# Patient Record
Sex: FEMALE | Race: WHITE
[De-identification: names, ages, dates, MRNs, and addresses within clinical notes are randomized per-mention and may not be internally consistent; named-entity substitution may affect disease eponyms.]

---

## 2019-09-10 ENCOUNTER — HOSPITAL ENCOUNTER (OUTPATIENT)
Age: 49
End: 2019-09-10
Payer: COMMERCIAL

## 2019-09-10 DIAGNOSIS — E03.9: Primary | ICD-10-CM

## 2019-09-10 LAB — FREE T3: 2.6 PG/ML (ref 2.18–3.98)

## 2019-09-10 PROCEDURE — 84439 ASSAY OF FREE THYROXINE: CPT

## 2019-09-10 PROCEDURE — 84443 ASSAY THYROID STIM HORMONE: CPT

## 2019-09-10 PROCEDURE — 86800 THYROGLOBULIN ANTIBODY: CPT

## 2019-09-10 PROCEDURE — 86376 MICROSOMAL ANTIBODY EACH: CPT

## 2019-09-10 PROCEDURE — 36415 COLL VENOUS BLD VENIPUNCTURE: CPT

## 2019-09-10 PROCEDURE — 84481 FREE ASSAY (FT-3): CPT

## 2023-05-11 ENCOUNTER — APPOINTMENT (OUTPATIENT)
Dept: PRIMARY CARE | Facility: CLINIC | Age: 53
End: 2023-05-11
Payer: COMMERCIAL

## 2023-06-08 ENCOUNTER — OFFICE VISIT (OUTPATIENT)
Dept: PRIMARY CARE | Facility: CLINIC | Age: 53
End: 2023-06-08
Payer: COMMERCIAL

## 2023-06-08 ENCOUNTER — LAB (OUTPATIENT)
Dept: LAB | Facility: LAB | Age: 53
End: 2023-06-08
Payer: COMMERCIAL

## 2023-06-08 VITALS
DIASTOLIC BLOOD PRESSURE: 80 MMHG | HEART RATE: 62 BPM | HEIGHT: 67 IN | SYSTOLIC BLOOD PRESSURE: 122 MMHG | BODY MASS INDEX: 32.49 KG/M2 | WEIGHT: 207 LBS

## 2023-06-08 DIAGNOSIS — E03.9 ACQUIRED HYPOTHYROIDISM: ICD-10-CM

## 2023-06-08 DIAGNOSIS — E53.8 VITAMIN B12 DEFICIENCY: ICD-10-CM

## 2023-06-08 DIAGNOSIS — E06.3 HASHIMOTO'S DISEASE: Primary | ICD-10-CM

## 2023-06-08 DIAGNOSIS — E55.9 VITAMIN D DEFICIENCY: ICD-10-CM

## 2023-06-08 DIAGNOSIS — M50.20 DISCOGENIC SYNDROME, CERVICAL: ICD-10-CM

## 2023-06-08 DIAGNOSIS — R79.89 ABNORMAL THYROID BLOOD TEST: Primary | ICD-10-CM

## 2023-06-08 PROBLEM — M47.812 ARTHRITIS OF FACET JOINT OF CERVICAL SPINE: Status: ACTIVE | Noted: 2023-06-08

## 2023-06-08 PROBLEM — M50.30 DISCOGENIC SYNDROME, CERVICAL: Status: ACTIVE | Noted: 2023-06-08

## 2023-06-08 PROBLEM — G56.03 CARPAL TUNNEL SYNDROME, BILATERAL: Status: ACTIVE | Noted: 2018-05-03

## 2023-06-08 PROBLEM — M79.7 FIBROMYALGIA: Status: ACTIVE | Noted: 2023-06-08

## 2023-06-08 PROBLEM — N64.59 ABNORMAL BREAST TISSUE: Status: ACTIVE | Noted: 2023-06-08

## 2023-06-08 PROBLEM — G47.33 OBSTRUCTIVE SLEEP APNEA ON CPAP: Status: ACTIVE | Noted: 2023-06-08

## 2023-06-08 LAB
THYROTROPIN (MIU/L) IN SER/PLAS BY DETECTION LIMIT <= 0.05 MIU/L: 0.09 MIU/L (ref 0.44–3.98)
THYROXINE (T4) FREE (NG/DL) IN SER/PLAS: 0.98 NG/DL (ref 0.61–1.12)

## 2023-06-08 PROCEDURE — 1036F TOBACCO NON-USER: CPT | Performed by: INTERNAL MEDICINE

## 2023-06-08 PROCEDURE — 84443 ASSAY THYROID STIM HORMONE: CPT

## 2023-06-08 PROCEDURE — 84439 ASSAY OF FREE THYROXINE: CPT

## 2023-06-08 PROCEDURE — 99214 OFFICE O/P EST MOD 30 MIN: CPT | Performed by: INTERNAL MEDICINE

## 2023-06-08 PROCEDURE — 36415 COLL VENOUS BLD VENIPUNCTURE: CPT

## 2023-06-08 RX ORDER — TOPIRAMATE 25 MG/1
25 TABLET ORAL DAILY
COMMUNITY
Start: 2022-07-19 | End: 2024-01-09 | Stop reason: ALTCHOICE

## 2023-06-08 ASSESSMENT — ENCOUNTER SYMPTOMS
PALPITATIONS: 0
NAUSEA: 0
BACK PAIN: 0
DIARRHEA: 0
FATIGUE: 0
WHEEZING: 0
COUGH: 0
SHORTNESS OF BREATH: 0
ABDOMINAL PAIN: 0
ARTHRALGIAS: 0
BLOOD IN STOOL: 0
VOMITING: 0
CHEST TIGHTNESS: 0

## 2023-06-08 ASSESSMENT — PATIENT HEALTH QUESTIONNAIRE - PHQ9
2. FEELING DOWN, DEPRESSED OR HOPELESS: NOT AT ALL
SUM OF ALL RESPONSES TO PHQ9 QUESTIONS 1 AND 2: 0
1. LITTLE INTEREST OR PLEASURE IN DOING THINGS: NOT AT ALL

## 2023-06-08 NOTE — RESULT ENCOUNTER NOTE
"I called to let her know that her T4 looked fine but that her TSH was suppressed implying that she may have too much thyroid hormone circulating in her system.  She states she is not on any thyroid medication.  She used to see Dr. Aldridge but she would like to see somebody different and she states she does \"not want to travel far \".  I told her that we had plenty of endocrinologist in our system but you do have to drive out of town.  She states she is going to check around especially with her insurance and will let us know who she wants to see.  I told her I would put the referral in."

## 2023-06-08 NOTE — ASSESSMENT & PLAN NOTE
-We are providing refills on her vitamin B12 injections  -She states that what is been most effective for her is to take the B12 injection twice a month as once a month has been an effective  -We will see her back in 6 months and check a B12 level at that time

## 2023-06-08 NOTE — ASSESSMENT & PLAN NOTE
-She will go for a TSH and free T4 and have agreed to call her with results and discussion as to whether she needs supplementation

## 2023-06-08 NOTE — PROGRESS NOTES
Subjective   Patient ID: Michelle Shi is a 52 y.o. female who presents for No chief complaint on file..  HPI  She is here today for reevaluation.  She explains that she continues to have significant issues with her neck and radiculopathy especially down the left side.  She has been going to the pain clinic and seeing Bianca Woodson.  Back in February she had an MRI of her cervical spine and it did show multilevel degenerative disc disease but also what was characterizes moderate to severe foraminal stenosis.  She states that she has been receiving some cortisone injections but unfortunately its not really helping and she is becoming leery about getting repeated cortisone dosing.  She reminds me that she has had cortisone injections in various joints for many years and she is concerned about the cumulative dose.  We did review her MRI report together and we discussed getting an opinion from a surgeon.  She states she is done some research and has a specific surgeon she would like to see in [place].  She states she will contact Bianca about getting that referral and I told her that I am sure that she will be glad to make the referral for a second opinion.  She states that she is also still having issues with her right-sided carpal tunnel syndrome.  We also discussed her headaches which we feel are stemming from her neck pathology.  We are providing a refill on her vitamin B12 as it has been effective in addressing her B12 deficiency.  We also discussed her diagnosis of Hashimoto's thyroid disease.  We are reminded that she was seen by endocrinology, Dr. Aldridge.  She states that her last labs were relatively normal so she was not prescribed a thyroid supplement.  Its been a while so we will have her go get the thyroid blood work and then I will call her with results.  We will go from there  Review of Systems   Constitutional:  Negative for fatigue.   Respiratory:  Negative for cough, chest tightness, shortness of  breath and wheezing.    Cardiovascular:  Negative for chest pain, palpitations and leg swelling.   Gastrointestinal:  Negative for abdominal pain, blood in stool, diarrhea, nausea and vomiting.   Musculoskeletal:  Negative for arthralgias and back pain.     Objective   Physical Exam  Vitals and nursing note reviewed.   Constitutional:       General: She is not in acute distress.     Appearance: Normal appearance.   HENT:      Head: Normocephalic and atraumatic.   Eyes:      Conjunctiva/sclera: Conjunctivae normal.   Cardiovascular:      Rate and Rhythm: Normal rate and regular rhythm.      Heart sounds: Normal heart sounds.   Pulmonary:      Effort: No respiratory distress.      Breath sounds: No wheezing.   Abdominal:      Palpations: Abdomen is soft.      Tenderness: There is no abdominal tenderness. There is no guarding.   Musculoskeletal:         General: No swelling. Normal range of motion.   Skin:     General: Skin is warm and dry.   Neurological:      General: No focal deficit present.      Mental Status: She is alert and oriented to person, place, and time.   Psychiatric:         Behavior: Behavior normal.       Assessment/Plan   Problem List Items Addressed This Visit          Musculoskeletal    Discogenic syndrome, cervical     -MRI was performed February 21, 2023  -She has been going to the pain clinic  -We discussed her current therapy so far and unfortunately is not been all that effective  -She will contact the pain clinic about referral to a neurosurgeon         Relevant Orders    Follow Up In Primary Care       Endocrine/Metabolic    Vitamin D deficiency    Relevant Orders    Vitamin D 25-Hydroxy,Total    Vitamin B12 deficiency     -We are providing refills on her vitamin B12 injections  -She states that what is been most effective for her is to take the B12 injection twice a month as once a month has been an effective  -We will see her back in 6 months and check a B12 level at that time          Relevant Medications    vitamin B complex 987-0-990-2-2 mg/mL injection    Other Relevant Orders    Follow Up In Primary Care    Vitamin B12    Hashimoto's disease - Primary    Relevant Orders    Follow Up In Primary Care    Hypothyroidism     -She will go for a TSH and free T4 and have agreed to call her with results and discussion as to whether she needs supplementation         Relevant Orders    Follow Up In Primary Care    TSH    T4, free          Kiarra Smith,

## 2023-06-08 NOTE — PATIENT INSTRUCTIONS
As we discussed please contact Bianca Woodson about getting a referral to neurosurgery  Before leaving today you will have your thyroid blood work done and when it comes back we will contact you with results  We sent a refill for your vitamin B12  I would like to see you back in 6 months and just prior to that visit we will be checking a vitamin B12 and vitamin D level

## 2023-07-07 ENCOUNTER — TELEPHONE (OUTPATIENT)
Dept: PRIMARY CARE | Facility: CLINIC | Age: 53
End: 2023-07-07
Payer: COMMERCIAL

## 2023-07-07 NOTE — TELEPHONE ENCOUNTER
Pt called stating that she has been light headed the last 4 days, she think it has something to do with her thyroid and would like for you to order labs to evaluate. I did tell her it may be best to just have an appointment with you. Please advise, Thank you.

## 2023-07-07 NOTE — TELEPHONE ENCOUNTER
"Please advise that exactly 1 month ago she had thyroid blood work ALREADY and it came back abnormal.  (Please see lab test results and my extensive message that I had documented after calling her and discussing results and plan for follow-up).  She had lab work done approximately 4 weeks ago and the thyroid blood test was abnormal.  When I contacted her she indicated that she was not taking any thyroid medication.  I told her that it appeared that she was suffering from hyperthyroidism and I wanted her to see endocrinology.  She had been seeing Dr. Shore but she indicated that she did not want to go there anymore.  I told her that I would like for her to see somebody right away. She then indicated that she did not \"want to drive out of town\".  I told her that all of our providers are out of town.  She then told me that she was going to check around and check with her insurance regarding a provider that she is permitted to see.  She has not gotten back with me.  I do not really think that rechecking the thyroid now will be of use as I am sure it is still abnormal since nothing is happened with treatment.  I would be happy to see her in the office next week to discuss her symptoms and if her symptoms get severe she needs to go to the emergency room.  I hope this is helpful and lets get a referral going to endocrinology as I had originally recommended.  Thank you  "

## 2023-10-09 PROBLEM — L65.9 HAIR LOSS: Status: ACTIVE | Noted: 2023-10-09

## 2023-10-09 PROBLEM — M75.101 TEAR OF RIGHT SUPRASPINATUS TENDON: Status: ACTIVE | Noted: 2023-10-09

## 2023-10-09 PROBLEM — M54.9 BACK PAIN, CHRONIC: Status: ACTIVE | Noted: 2023-10-09

## 2023-10-09 PROBLEM — M54.12 CERVICAL NEURITIS: Status: ACTIVE | Noted: 2023-10-09

## 2023-10-09 PROBLEM — G47.00 INSOMNIA DISORDER: Status: ACTIVE | Noted: 2023-10-09

## 2023-10-09 PROBLEM — G56.00 CARPAL TUNNEL SYNDROME: Status: ACTIVE | Noted: 2023-10-09

## 2023-10-09 PROBLEM — G89.29 BACK PAIN, CHRONIC: Status: ACTIVE | Noted: 2023-10-09

## 2023-10-09 PROBLEM — E66.812 CLASS 2 SEVERE OBESITY DUE TO EXCESS CALORIES WITH SERIOUS COMORBIDITY AND BODY MASS INDEX (BMI) OF 37.0 TO 37.9 IN ADULT: Status: ACTIVE | Noted: 2023-10-09

## 2023-10-09 PROBLEM — M54.2 NECK PAIN, CHRONIC: Status: ACTIVE | Noted: 2023-10-09

## 2023-10-09 PROBLEM — I63.9 STROKE (MULTI): Status: ACTIVE | Noted: 2023-10-09

## 2023-10-09 PROBLEM — E66.01 CLASS 2 SEVERE OBESITY DUE TO EXCESS CALORIES WITH SERIOUS COMORBIDITY AND BODY MASS INDEX (BMI) OF 37.0 TO 37.9 IN ADULT (MULTI): Status: ACTIVE | Noted: 2023-10-09

## 2023-10-09 PROBLEM — R69 TAKING MEDICATION FOR CHRONIC DISEASE: Status: ACTIVE | Noted: 2023-10-09

## 2023-10-09 PROBLEM — M75.40 SHOULDER IMPINGEMENT SYNDROME: Status: ACTIVE | Noted: 2023-10-09

## 2023-10-09 PROBLEM — G89.29 NECK PAIN, CHRONIC: Status: ACTIVE | Noted: 2023-10-09

## 2023-10-09 PROBLEM — R53.83 FATIGUE: Status: ACTIVE | Noted: 2023-10-09

## 2023-10-09 PROBLEM — M77.01 MEDIAL EPICONDYLITIS OF RIGHT ELBOW: Status: ACTIVE | Noted: 2023-10-09

## 2023-10-09 PROBLEM — G56.21 CUBITAL TUNNEL SYNDROME ON RIGHT: Status: ACTIVE | Noted: 2023-10-09

## 2023-10-09 RX ORDER — LIDOCAINE 560 MG/1
PATCH PERCUTANEOUS; TOPICAL; TRANSDERMAL
COMMUNITY

## 2023-10-09 RX ORDER — CYANOCOBALAMIN 1000 UG/ML
INJECTION, SOLUTION INTRAMUSCULAR; SUBCUTANEOUS
COMMUNITY
Start: 2022-11-12

## 2023-10-09 RX ORDER — PHENYLPROPANOLAMINE/CLEMASTINE 75-1.34MG
TABLET, EXTENDED RELEASE ORAL
COMMUNITY

## 2023-10-09 RX ORDER — TOPIRAMATE 25 MG/1
9-10 TABLET ORAL DAILY
COMMUNITY
End: 2023-10-10 | Stop reason: SDUPTHER

## 2023-10-09 RX ORDER — SYRINGE WITH NEEDLE, 1 ML 27GX1/2"
SYRINGE, EMPTY DISPOSABLE MISCELLANEOUS
COMMUNITY

## 2023-10-09 RX ORDER — HYDROCODONE BITARTRATE AND ACETAMINOPHEN 7.5; 325 MG/1; MG/1
1 TABLET ORAL
COMMUNITY
Start: 2022-10-20

## 2023-10-10 ENCOUNTER — OFFICE VISIT (OUTPATIENT)
Dept: PAIN MEDICINE | Facility: CLINIC | Age: 53
End: 2023-10-10
Payer: COMMERCIAL

## 2023-10-10 VITALS
DIASTOLIC BLOOD PRESSURE: 84 MMHG | BODY MASS INDEX: 30.76 KG/M2 | SYSTOLIC BLOOD PRESSURE: 114 MMHG | RESPIRATION RATE: 16 BRPM | HEART RATE: 97 BPM | HEIGHT: 67 IN | WEIGHT: 196 LBS

## 2023-10-10 DIAGNOSIS — M79.7 FIBROMYALGIA: Primary | ICD-10-CM

## 2023-10-10 DIAGNOSIS — M50.20 DISCOGENIC SYNDROME, CERVICAL: ICD-10-CM

## 2023-10-10 DIAGNOSIS — M54.2 NECK PAIN, CHRONIC: ICD-10-CM

## 2023-10-10 DIAGNOSIS — M54.12 CERVICAL NEURITIS: ICD-10-CM

## 2023-10-10 DIAGNOSIS — G89.29 NECK PAIN, CHRONIC: ICD-10-CM

## 2023-10-10 DIAGNOSIS — M47.812 ARTHRITIS OF FACET JOINT OF CERVICAL SPINE: ICD-10-CM

## 2023-10-10 PROCEDURE — 99213 OFFICE O/P EST LOW 20 MIN: CPT | Performed by: PHYSICIAN ASSISTANT

## 2023-10-10 RX ORDER — TOPIRAMATE 25 MG/1
225-250 TABLET ORAL DAILY
Qty: 900 TABLET | Refills: 0 | Status: SHIPPED | OUTPATIENT
Start: 2023-10-10 | End: 2024-01-09 | Stop reason: SDUPTHER

## 2023-10-10 RX ORDER — DEXTROMETHORPHAN HYDROBROMIDE, GUAIFENESIN 5; 100 MG/5ML; MG/5ML
650 LIQUID ORAL EVERY 8 HOURS PRN
COMMUNITY

## 2023-10-10 ASSESSMENT — PAIN SCALES - GENERAL: PAINLEVEL: 5

## 2023-10-10 ASSESSMENT — ENCOUNTER SYMPTOMS
EYES NEGATIVE: 1
NUMBNESS: 1
ENDOCRINE NEGATIVE: 1
RESPIRATORY NEGATIVE: 1
CONSTITUTIONAL NEGATIVE: 1
ALLERGIC/IMMUNOLOGIC NEGATIVE: 1
PSYCHIATRIC NEGATIVE: 1
CARDIOVASCULAR NEGATIVE: 1
GASTROINTESTINAL NEGATIVE: 1
NECK PAIN: 1
HEMATOLOGIC/LYMPHATIC NEGATIVE: 1

## 2023-10-10 ASSESSMENT — PATIENT HEALTH QUESTIONNAIRE - PHQ9
SUM OF ALL RESPONSES TO PHQ9 QUESTIONS 1 AND 2: 0
1. LITTLE INTEREST OR PLEASURE IN DOING THINGS: NOT AT ALL
2. FEELING DOWN, DEPRESSED OR HOPELESS: NOT AT ALL

## 2023-10-10 ASSESSMENT — COLUMBIA-SUICIDE SEVERITY RATING SCALE - C-SSRS
1. IN THE PAST MONTH, HAVE YOU WISHED YOU WERE DEAD OR WISHED YOU COULD GO TO SLEEP AND NOT WAKE UP?: NO
2. HAVE YOU ACTUALLY HAD ANY THOUGHTS OF KILLING YOURSELF?: NO
6. HAVE YOU EVER DONE ANYTHING, STARTED TO DO ANYTHING, OR PREPARED TO DO ANYTHING TO END YOUR LIFE?: NO

## 2023-10-10 NOTE — PROGRESS NOTES
Subjective   Patient ID: Michelle Shi is a 53 y.o. female who presents for [facility] Surgical referral Dr. Espinoza wants to do neck fusion. Today having pain in her lt side neck radiating in to her  occipital area in her head,  lt shoulder and at down her Lt arm and lt hand, rates 5/10 now and 10/10 at worst describes as stabbing pain, she will have pins, needles and numbness at times in lt arm and hand Lt hand is sometime cold to touch wand rt hand is warm.   She reports any activity increases her pain worst is any head and neck movement, lifting, bending, personal care, standing for 1 hour and walking 0.5 miles all cause increased pain. She  does a HEP stretching, Tylenol, repositioning frequently, and Topamax helps with the HA. LAVON score 58/100.     HPI  patient is a 53-year-old female.  She presents today for follow-up after Seeing Dr. Espinoza.  She states that he did discuss with her possible fusion.  She has gone to 2 appointments now.  She is not sure what she wants to do but she states that she has been thinking about her options.  She had a few questions that I tried to answer to the best my ability.  At this time, she continues to have neck pain with left greater than right arm pain as well as numbness and tingling.  She states that the left pain is much more bothersome than the right-sided pain.  She rates it a 5-10/10 depending on her activities.  She has been doing her home exercise program, Tylenol, and using Topamax.  She tolerates the Topamax and it helps her.  It is not perfect but she states that it is better than before.  At this time, she would like a refill and she wants to just continue on her medications while she continues to weigh her options.  Review of Systems   Constitutional: Negative.    HENT: Negative.     Eyes: Negative.    Respiratory: Negative.     Cardiovascular: Negative.    Gastrointestinal: Negative.    Endocrine: Negative.    Genitourinary: Negative.    Musculoskeletal:  Positive for  neck pain.   Allergic/Immunologic: Negative.    Neurological:  Positive for numbness.   Hematological: Negative.    Psychiatric/Behavioral: Negative.         Objective   Physical Exam  Vitals reviewed.   Constitutional:       Appearance: Normal appearance.   HENT:      Head: Normocephalic.      Right Ear: External ear normal.      Left Ear: External ear normal.      Nose: Nose normal.      Mouth/Throat:      Mouth: Mucous membranes are moist.   Eyes:      Pupils: Pupils are equal, round, and reactive to light.   Cardiovascular:      Rate and Rhythm: Normal rate and regular rhythm.      Pulses: Normal pulses.   Musculoskeletal:         General: Normal range of motion.      Cervical back: Normal range of motion.      Comments: 5/5 upper extremity strength other than left deltoid, biceps, triceps, and MP flexion and extension 4+ to 5 -/5   Neurological:      General: No focal deficit present.      Mental Status: She is alert and oriented to person, place, and time. Mental status is at baseline.   Psychiatric:         Mood and Affect: Mood normal.         Behavior: Behavior normal.         Thought Content: Thought content normal.         Judgment: Judgment normal.         Assessment/Plan   Diagnoses and all orders for this visit:  Fibromyalgia  Arthritis of facet joint of cervical spine  -     topiramate (Topamax) 25 mg tablet; Take 9-10 tablets (225-250 mg) by mouth once daily. Take 4-5 in the morning and 4-5 in the evening. No more than 10 a day.  -     Follow Up In Pain Medicine; Future  Cervical neuritis  -     topiramate (Topamax) 25 mg tablet; Take 9-10 tablets (225-250 mg) by mouth once daily. Take 4-5 in the morning and 4-5 in the evening. No more than 10 a day.  -     Follow Up In Pain Medicine; Future  Discogenic syndrome, cervical  -     topiramate (Topamax) 25 mg tablet; Take 9-10 tablets (225-250 mg) by mouth once daily. Take 4-5 in the morning and 4-5 in the evening. No more than 10 a day.  -     Follow Up  In Pain Medicine; Future  Neck pain, chronic       Patient is a 53-year-old female with a past medical history significant for fibromyalgia, cervical spondylosis, cervical degenerative disease, neck pain, and cervical neuritis.  She saw surgeon who did discuss surgery with her.  She states that she is still considering her options.  She is not sure if she wants to pursue the surgery but she is going to think about it.  I answered her questions to the best of my ability.  At this time, she is going to continue on Topamax.  She tolerates this well.  No side effects but she takes this as prescribed.  A 90-day supply will be sent to her pharmacy.  She is going to follow-up in 3 months for reevaluation and medication management.  She will call the clinic sooner if necessary.  OARRS reviewed.

## 2023-12-07 ENCOUNTER — APPOINTMENT (OUTPATIENT)
Dept: PRIMARY CARE | Facility: CLINIC | Age: 53
End: 2023-12-07
Payer: COMMERCIAL

## 2023-12-08 ENCOUNTER — TELEPHONE (OUTPATIENT)
Dept: PRIMARY CARE | Facility: CLINIC | Age: 53
End: 2023-12-08

## 2023-12-08 ENCOUNTER — LAB (OUTPATIENT)
Dept: LAB | Facility: LAB | Age: 53
End: 2023-12-08
Payer: COMMERCIAL

## 2023-12-08 ENCOUNTER — OFFICE VISIT (OUTPATIENT)
Dept: PRIMARY CARE | Facility: CLINIC | Age: 53
End: 2023-12-08
Payer: COMMERCIAL

## 2023-12-08 VITALS
HEART RATE: 76 BPM | WEIGHT: 190 LBS | BODY MASS INDEX: 29.76 KG/M2 | DIASTOLIC BLOOD PRESSURE: 80 MMHG | SYSTOLIC BLOOD PRESSURE: 124 MMHG | OXYGEN SATURATION: 98 %

## 2023-12-08 DIAGNOSIS — Z12.31 ENCOUNTER FOR SCREENING MAMMOGRAM FOR MALIGNANT NEOPLASM OF BREAST: Primary | ICD-10-CM

## 2023-12-08 DIAGNOSIS — E53.8 VITAMIN B12 DEFICIENCY: ICD-10-CM

## 2023-12-08 DIAGNOSIS — G47.33 OBSTRUCTIVE SLEEP APNEA ON CPAP: ICD-10-CM

## 2023-12-08 DIAGNOSIS — E55.9 VITAMIN D DEFICIENCY: ICD-10-CM

## 2023-12-08 DIAGNOSIS — E06.3 HASHIMOTO'S DISEASE: ICD-10-CM

## 2023-12-08 DIAGNOSIS — M50.20 DISCOGENIC SYNDROME, CERVICAL: ICD-10-CM

## 2023-12-08 DIAGNOSIS — E03.9 ACQUIRED HYPOTHYROIDISM: ICD-10-CM

## 2023-12-08 LAB
25(OH)D3 SERPL-MCNC: 37 NG/ML (ref 30–100)
VIT B12 SERPL-MCNC: 260 PG/ML (ref 211–911)

## 2023-12-08 PROCEDURE — 99213 OFFICE O/P EST LOW 20 MIN: CPT | Performed by: INTERNAL MEDICINE

## 2023-12-08 PROCEDURE — 36415 COLL VENOUS BLD VENIPUNCTURE: CPT

## 2023-12-08 PROCEDURE — 1036F TOBACCO NON-USER: CPT | Performed by: INTERNAL MEDICINE

## 2023-12-08 PROCEDURE — 82607 VITAMIN B-12: CPT

## 2023-12-08 PROCEDURE — 82306 VITAMIN D 25 HYDROXY: CPT

## 2023-12-08 RX ORDER — ACETAMINOPHEN 500 MG
5000 TABLET ORAL DAILY
COMMUNITY

## 2023-12-08 ASSESSMENT — ENCOUNTER SYMPTOMS
ABDOMINAL PAIN: 0
COUGH: 0
DIARRHEA: 0
FATIGUE: 1
PALPITATIONS: 0
VOMITING: 0
BACK PAIN: 0
NECK PAIN: 1
NAUSEA: 0
ARTHRALGIAS: 0
BLOOD IN STOOL: 0
SHORTNESS OF BREATH: 0
WHEEZING: 0

## 2023-12-08 ASSESSMENT — PATIENT HEALTH QUESTIONNAIRE - PHQ9
SUM OF ALL RESPONSES TO PHQ9 QUESTIONS 1 AND 2: 0
2. FEELING DOWN, DEPRESSED OR HOPELESS: NOT AT ALL
1. LITTLE INTEREST OR PLEASURE IN DOING THINGS: NOT AT ALL

## 2023-12-08 NOTE — PROGRESS NOTES
Subjective   Patient ID: Michelle Shi is a 53 y.o. female who presents for Follow-up (6 MO FUV. ).  HPI  She is here today for follow-up.  She just had her blood drawn to follow-up on her vitamin D and B12 level as well as her thyroid condition.  The results are not back but I have agreed to contact her and then we can decide if anything needs to change in the way of her current medical regimen.  She also has been going to the pain clinic and following with Bianca Woodson.  She was also referred to surgery and that they are trying to come up with the best solution for her chronic pain and conditions.  She states that she has been thinking about surgery but she is a little bit apprehensive and she is weighing the options as of now.  We talked about her sleep apnea and she states she is not able to wear the CPAP device because of her nasal fracture.  She states as soon as that gets corrected she will get back to therapy.  We also conducted a review of systems and we talked about cancer screening.  She will be due for her mammogram in just a few weeks and she is agreeable to getting that scheduled early next year.  We also talked about colon cancer screening and today she has not had any type of screening.  She does not have any known family history of colon cancer.  We talked about colonoscopy versus Cologuard versus the fecal occult blood test kit.  She chose the latter and she has agreed to get that in soon.  I will summarize everything in a problem based format .  Review of Systems   Constitutional:  Positive for fatigue.   Respiratory:  Negative for cough, shortness of breath and wheezing.    Cardiovascular:  Negative for chest pain, palpitations and leg swelling.   Gastrointestinal:  Negative for abdominal pain, blood in stool, diarrhea, nausea and vomiting.   Musculoskeletal:  Positive for neck pain. Negative for arthralgias and back pain.     Objective   Physical Exam  Vitals and nursing note reviewed.    Constitutional:       General: She is not in acute distress.     Appearance: Normal appearance.   HENT:      Head: Normocephalic and atraumatic.   Eyes:      Conjunctiva/sclera: Conjunctivae normal.   Cardiovascular:      Rate and Rhythm: Normal rate and regular rhythm.      Heart sounds: Normal heart sounds.   Pulmonary:      Effort: No respiratory distress.      Breath sounds: No wheezing.   Abdominal:      Palpations: Abdomen is soft.      Tenderness: There is no abdominal tenderness. There is no guarding.   Musculoskeletal:         General: No swelling. Normal range of motion.   Skin:     General: Skin is warm and dry.   Neurological:      General: No focal deficit present.      Mental Status: She is alert and oriented to person, place, and time.   Psychiatric:         Behavior: Behavior normal.         Assessment/Plan   Problem List Items Addressed This Visit             ICD-10-CM    Vitamin D deficiency E55.9    Vitamin B12 deficiency E53.8    Obstructive sleep apnea on CPAP G47.33     -not able to wear mask due to nose surgery         Hashimoto's disease E06.3    Hypothyroidism E03.9    Discogenic syndrome, cervical M50.20    Encounter for screening mammogram for malignant neoplasm of breast - Primary Z12.31          Kiarra Smith, DO

## 2023-12-08 NOTE — PATIENT INSTRUCTIONS
As we discussed when your laboratory test results come back we will contact you and at that point might make a recommendation on changing medication  The staff will be helping you to schedule your mammogram at some point early next year  As we discussed you are due for colorectal cancer screening which is recommended when you turn 52  Please complete the FOBT kit next week and drop it off at the

## 2023-12-08 NOTE — TELEPHONE ENCOUNTER
Patient states she is due for mammogram. Can you put the order in and I will get it schedule with patient.

## 2023-12-12 NOTE — RESULT ENCOUNTER NOTE
I called her tonight about her vitamin B 12 and although she is getting injections twice a month we talked about also starting a sublingual preparation and she will try the 1000 mcg under the tongue daily.  For her vitamin D she will continue with the 5000 international units daily as this seems to be helping her vitamin D level in an acceptable range.

## 2024-01-09 ENCOUNTER — OFFICE VISIT (OUTPATIENT)
Dept: PAIN MEDICINE | Facility: CLINIC | Age: 54
End: 2024-01-09
Payer: COMMERCIAL

## 2024-01-09 VITALS
BODY MASS INDEX: 30.23 KG/M2 | SYSTOLIC BLOOD PRESSURE: 132 MMHG | WEIGHT: 193 LBS | HEART RATE: 91 BPM | DIASTOLIC BLOOD PRESSURE: 87 MMHG

## 2024-01-09 DIAGNOSIS — M47.812 ARTHRITIS OF FACET JOINT OF CERVICAL SPINE: ICD-10-CM

## 2024-01-09 DIAGNOSIS — M50.20 DISCOGENIC SYNDROME, CERVICAL: ICD-10-CM

## 2024-01-09 DIAGNOSIS — G89.29 NECK PAIN, CHRONIC: ICD-10-CM

## 2024-01-09 DIAGNOSIS — M54.2 NECK PAIN, CHRONIC: ICD-10-CM

## 2024-01-09 DIAGNOSIS — M79.7 FIBROMYALGIA: ICD-10-CM

## 2024-01-09 DIAGNOSIS — M54.12 CERVICAL NEURITIS: Primary | ICD-10-CM

## 2024-01-09 PROCEDURE — 99213 OFFICE O/P EST LOW 20 MIN: CPT | Performed by: PHYSICIAN ASSISTANT

## 2024-01-09 RX ORDER — TOPIRAMATE 25 MG/1
225-250 TABLET ORAL DAILY
Qty: 900 TABLET | Refills: 0 | Status: SHIPPED | OUTPATIENT
Start: 2024-01-09 | End: 2024-04-09 | Stop reason: SDUPTHER

## 2024-01-09 RX ORDER — LANOLIN ALCOHOL/MO/W.PET/CERES
1000 CREAM (GRAM) TOPICAL DAILY
COMMUNITY

## 2024-01-09 ASSESSMENT — PATIENT HEALTH QUESTIONNAIRE - PHQ9: 2. FEELING DOWN, DEPRESSED OR HOPELESS: NOT AT ALL

## 2024-01-09 ASSESSMENT — ENCOUNTER SYMPTOMS
NUMBNESS: 1
EYES NEGATIVE: 1
HEMATOLOGIC/LYMPHATIC NEGATIVE: 1
CARDIOVASCULAR NEGATIVE: 1
GASTROINTESTINAL NEGATIVE: 1
PSYCHIATRIC NEGATIVE: 1
ALLERGIC/IMMUNOLOGIC NEGATIVE: 1
ENDOCRINE NEGATIVE: 1
RESPIRATORY NEGATIVE: 1
NECK PAIN: 1
ARTHRALGIAS: 1
CONSTITUTIONAL NEGATIVE: 1

## 2024-01-09 ASSESSMENT — PAIN SCALES - GENERAL: PAINLEVEL: 4

## 2024-01-09 ASSESSMENT — COLUMBIA-SUICIDE SEVERITY RATING SCALE - C-SSRS
2. HAVE YOU ACTUALLY HAD ANY THOUGHTS OF KILLING YOURSELF?: NO
1. IN THE PAST MONTH, HAVE YOU WISHED YOU WERE DEAD OR WISHED YOU COULD GO TO SLEEP AND NOT WAKE UP?: NO
6. HAVE YOU EVER DONE ANYTHING, STARTED TO DO ANYTHING, OR PREPARED TO DO ANYTHING TO END YOUR LIFE?: NO

## 2024-01-09 NOTE — PROGRESS NOTES
Subjective   Patient ID: Michelle Shi is a 53 y.o. female who presents for Med Management (FOLLOW UP ON TOPIRAMATE, SHE STATES IT IS HELPING WITH THE BURNING FEELING IN HER NECK AND CONTROLLING HER HEADACHES, SHE GETS DISCOMFORT WITH ROTATING HER HEAD OR LOOKING UP WITH TILTED HEAD, SHE HAS GONE DOWN IN THE NUMBER OF HEADACHES SINCE TAKING TOPIRAMATE SHE HAS AN AVERAGE OF 3 PER WEEK). SHE HAS NORCO PRN, SHE ALTERNATES ICE/HEAT, SHE DOES HER HOME STRETCHING EXERCISES FROM PHYSICAL THERAPY, HER APPT WITH DR. THORPE WAS RESCHEDULED TO LATE JANUARY, PAIN SCORE 4/10, LAVON=46% ,BMI 30.23 ED GIVEN, DEP-, SMOKING -  Patient is a 53-year-old female.  She presents today for a medication management follow-up.  At this time, she continues to have neck pain with left greater than right arm pain as well as numbness and tingling.  She states that the left pain is much more bothersome than the right-sided pain.  She rates her discomfort a 4/10.  She has been doing her home exercise program, Tylenol, and using Topamax.  She tolerates the Topamax and it helps her.  It is not perfect but she states that it is better than before.    She has previously seen Dr. Thorpe who talked to her about having a fusion.  She was supposed to have another appointment earlier in the week but this had to be postponed.  She is supposed to see him in less than 2 weeks.  She would like to see what her options are.  She is considering surgery but she still is not 100% sure what she wants to do.        Review of Systems   Constitutional: Negative.    HENT: Negative.     Eyes: Negative.    Respiratory: Negative.     Cardiovascular: Negative.    Gastrointestinal: Negative.    Endocrine: Negative.    Genitourinary: Negative.    Musculoskeletal:  Positive for arthralgias and neck pain.   Skin: Negative.    Allergic/Immunologic: Negative.    Neurological:  Positive for numbness.   Hematological: Negative.    Psychiatric/Behavioral: Negative.         Objective    Physical Exam  Vitals and nursing note reviewed.   Constitutional:       Appearance: Normal appearance.   HENT:      Head: Normocephalic and atraumatic.      Right Ear: External ear normal.      Left Ear: External ear normal.      Nose: Nose normal.      Mouth/Throat:      Mouth: Mucous membranes are moist.      Pharynx: Oropharynx is clear.   Cardiovascular:      Rate and Rhythm: Normal rate and regular rhythm.      Pulses: Normal pulses.   Pulmonary:      Effort: Pulmonary effort is normal.   Musculoskeletal:         General: Normal range of motion.      Cervical back: Normal range of motion.   Skin:     General: Skin is warm and dry.   Neurological:      General: No focal deficit present.      Mental Status: She is alert and oriented to person, place, and time. Mental status is at baseline.   Psychiatric:         Mood and Affect: Mood normal.         Behavior: Behavior normal.         Thought Content: Thought content normal.         Judgment: Judgment normal.         Assessment/Plan   Diagnoses and all orders for this visit:  Cervical neuritis  -     topiramate (Topamax) 25 mg tablet; Take 9-10 tablets (225-250 mg) by mouth once daily. Take 4-5 in the morning and 4-5 in the evening. No more than 10 a day.  Fibromyalgia  Neck pain, chronic  Arthritis of facet joint of cervical spine  -     topiramate (Topamax) 25 mg tablet; Take 9-10 tablets (225-250 mg) by mouth once daily. Take 4-5 in the morning and 4-5 in the evening. No more than 10 a day.  Discogenic syndrome, cervical  -     topiramate (Topamax) 25 mg tablet; Take 9-10 tablets (225-250 mg) by mouth once daily. Take 4-5 in the morning and 4-5 in the evening. No more than 10 a day.       Patient is a 53-year-old female with a past medical history significant for fibromyalgia, cervical spondylosis, cervical degenerative disease, neck pain, and cervical neuritis.   At this time, she is going to continue on Topamax.  She tolerates this well.  No side effects  but she takes this as prescribed.  A 90-day supply will be sent to her pharmacy.  OARRS reviewed.   In regards to her appointment with her surgeon this had to be postponed.  She is scheduled to see him within the next 2 weeks.  She is going to pursue this appointment and she is going to consider if she wants to have the surgery or not.  She did share with me that she may consider getting a second opinion but at this time, she is going to pursue her appointment with them and see what her options are.  She is in to follow-up in 3 months for medication management follow-up.  Call the clinic sooner if necessary.

## 2024-01-10 ENCOUNTER — HOSPITAL ENCOUNTER (OUTPATIENT)
Dept: RADIOLOGY | Facility: HOSPITAL | Age: 54
Discharge: HOME | End: 2024-01-10
Payer: COMMERCIAL

## 2024-01-10 DIAGNOSIS — Z12.31 ENCOUNTER FOR SCREENING MAMMOGRAM FOR MALIGNANT NEOPLASM OF BREAST: ICD-10-CM

## 2024-01-10 PROCEDURE — 77063 BREAST TOMOSYNTHESIS BI: CPT | Performed by: RADIOLOGY

## 2024-01-10 PROCEDURE — 77067 SCR MAMMO BI INCL CAD: CPT | Performed by: RADIOLOGY

## 2024-01-10 PROCEDURE — 77067 SCR MAMMO BI INCL CAD: CPT

## 2024-02-21 ENCOUNTER — OFFICE VISIT (OUTPATIENT)
Dept: PRIMARY CARE | Facility: CLINIC | Age: 54
End: 2024-02-21
Payer: COMMERCIAL

## 2024-02-21 VITALS
OXYGEN SATURATION: 99 % | WEIGHT: 192 LBS | SYSTOLIC BLOOD PRESSURE: 131 MMHG | HEART RATE: 84 BPM | BODY MASS INDEX: 30.07 KG/M2 | DIASTOLIC BLOOD PRESSURE: 80 MMHG

## 2024-02-21 DIAGNOSIS — S03.00XA DISLOCATION OF TEMPOROMANDIBULAR JOINT, INITIAL ENCOUNTER: Primary | ICD-10-CM

## 2024-02-21 PROBLEM — M47.812 ARTHRITIS OF FACET JOINT OF CERVICAL SPINE: Status: RESOLVED | Noted: 2023-06-08 | Resolved: 2024-02-21

## 2024-02-21 PROBLEM — Z12.31 ENCOUNTER FOR SCREENING MAMMOGRAM FOR MALIGNANT NEOPLASM OF BREAST: Status: RESOLVED | Noted: 2023-12-08 | Resolved: 2024-02-21

## 2024-02-21 PROBLEM — G56.00 CARPAL TUNNEL SYNDROME: Status: RESOLVED | Noted: 2023-10-09 | Resolved: 2024-02-21

## 2024-02-21 PROBLEM — E66.812 CLASS 2 SEVERE OBESITY DUE TO EXCESS CALORIES WITH SERIOUS COMORBIDITY AND BODY MASS INDEX (BMI) OF 37.0 TO 37.9 IN ADULT: Status: RESOLVED | Noted: 2023-10-09 | Resolved: 2024-02-21

## 2024-02-21 PROBLEM — R69 TAKING MEDICATION FOR CHRONIC DISEASE: Status: RESOLVED | Noted: 2023-10-09 | Resolved: 2024-02-21

## 2024-02-21 PROBLEM — E66.01 CLASS 2 SEVERE OBESITY DUE TO EXCESS CALORIES WITH SERIOUS COMORBIDITY AND BODY MASS INDEX (BMI) OF 37.0 TO 37.9 IN ADULT (MULTI): Status: RESOLVED | Noted: 2023-10-09 | Resolved: 2024-02-21

## 2024-02-21 PROBLEM — G56.03 CARPAL TUNNEL SYNDROME, BILATERAL: Status: RESOLVED | Noted: 2018-05-03 | Resolved: 2024-02-21

## 2024-02-21 PROBLEM — R53.83 FATIGUE: Status: RESOLVED | Noted: 2023-10-09 | Resolved: 2024-02-21

## 2024-02-21 PROCEDURE — 1036F TOBACCO NON-USER: CPT | Performed by: INTERNAL MEDICINE

## 2024-02-21 PROCEDURE — 99213 OFFICE O/P EST LOW 20 MIN: CPT | Performed by: INTERNAL MEDICINE

## 2024-02-21 RX ORDER — NAPROXEN 500 MG/1
500 TABLET ORAL
Qty: 20 TABLET | Refills: 0 | Status: SHIPPED | OUTPATIENT
Start: 2024-02-21 | End: 2024-03-02

## 2024-02-21 ASSESSMENT — ENCOUNTER SYMPTOMS
SHORTNESS OF BREATH: 0
BLOOD IN STOOL: 0
PALPITATIONS: 0
RHINORRHEA: 1
NAUSEA: 0
VOMITING: 0
ABDOMINAL PAIN: 0
COUGH: 0
SINUS PRESSURE: 0
DIARRHEA: 0
WHEEZING: 0
SORE THROAT: 0
SINUS PAIN: 0
FEVER: 0

## 2024-02-21 NOTE — ASSESSMENT & PLAN NOTE
-She presents today with a week and a half duration of discomfort around her left ear.  -Her ear exam looks normal  -She has been experiencing jaw pain and she definitely has clicking and crepitus with opening and closing her jaw.  -I do strongly suspect TMJ and I sent a handout to her via [employer] explaining this condition  -I am going to treat her for 10 days with Naprosyn and she is reminded to take this twice a day with food  -I also strongly recommend she wear a bite guard every night when she sleeps because this can often calm down the situation  -She is agreed to contact me to let me know how she is doing and if necessary she might need to see a specialist

## 2024-02-21 NOTE — PATIENT INSTRUCTIONS
As we discussed I sent a prescription to your pharmacy for the medication called Naprosyn and please take this twice a day with food.  I have given you a 10-day treatment in the hopes that this will calm down your condition  It is also vitally important to wear a bite guard every night when you sleep because a lot of people grinding their teeth when they sleep at night and this can make TMJ so much worse  Please read the handout I sent to you via [employer] explaining TMJ  I would sure appreciate an update from you in about a week or so to let me know how you are doing and certainly call if you feel like your condition is worsening or if you develop new symptoms  Please also turn in your FOBT kit at the front window at your earliest convenience

## 2024-02-21 NOTE — PROGRESS NOTES
Subjective   Patient ID: Michelle Shi is a 53 y.o. female who presents for No chief complaint on file..  HPI  She is here today with a 1-1/2-week history of discomfort involving her left ear.  She really has not had any significant respiratory symptoms although she does complain of having sometimes a runny nose.  She also states that her jaw hurts.  We did conduct a review of systems and on today's examination her EAC is patent and her tympanic membrane looks entirely normal.  There is a good cone of light reflex and no erythema is appreciated.  When I palpate her temporomandibular joint region she has a lot of crepitus with opening closing her jaw.  I do strongly suspect that the discomfort around her ear is stemming from TMJ.  We discussed this condition and I am sending her a handout that explains TMJ via [employer].  We discussed treatment and I have decided to give her a short stint of an anti-inflammatory to see if this will calm it down.  I also talked her about wearing a bite guard every time she sleeps at night because this has been proven to help significantly.  She will let me know how she is doing at the end of this treatment and we discussed possibly seeing an oral surgeon if she does not get relief from this condition.  We did conduct a review of systems.  I do remind her to turn in her fecal occult blood test kit ASAP  Review of Systems   Constitutional:  Negative for fever.   HENT:  Positive for congestion, ear pain and rhinorrhea. Negative for sinus pressure, sinus pain and sore throat.    Respiratory:  Negative for cough, shortness of breath and wheezing.    Cardiovascular:  Negative for chest pain, palpitations and leg swelling.   Gastrointestinal:  Negative for abdominal pain, blood in stool, diarrhea, nausea and vomiting.     Objective   Physical Exam  Vitals and nursing note reviewed.   Constitutional:       General: She is not in acute distress.     Appearance: Normal appearance.   HENT:       Head: Normocephalic and atraumatic.   Eyes:      Conjunctiva/sclera: Conjunctivae normal.   Cardiovascular:      Rate and Rhythm: Normal rate and regular rhythm.      Heart sounds: Normal heart sounds.   Pulmonary:      Effort: No respiratory distress.      Breath sounds: No wheezing.   Abdominal:      Palpations: Abdomen is soft.      Tenderness: There is no abdominal tenderness. There is no guarding.   Musculoskeletal:         General: No swelling. Normal range of motion.   Skin:     General: Skin is warm and dry.   Neurological:      General: No focal deficit present.      Mental Status: She is alert and oriented to person, place, and time.   Psychiatric:         Behavior: Behavior normal.       Assessment/Plan   Problem List Items Addressed This Visit             ICD-10-CM    Dislocation of jaw - Primary S03.00XA     -She presents today with a week and a half duration of discomfort around her left ear.  -Her ear exam looks normal  -She has been experiencing jaw pain and she definitely has clicking and crepitus with opening and closing her jaw.  -I do strongly suspect TMJ and I sent a handout to her via [employer] explaining this condition  -I am going to treat her for 10 days with Naprosyn and she is reminded to take this twice a day with food  -I also strongly recommend she wear a bite guard every night when she sleeps because this can often calm down the situation  -She is agreed to contact me to let me know how she is doing and if necessary she might need to see a specialist         Relevant Medications    naproxen (Naprosyn) 500 mg tablet          Kiarra Smith,

## 2024-04-09 ENCOUNTER — OFFICE VISIT (OUTPATIENT)
Dept: PAIN MEDICINE | Facility: CLINIC | Age: 54
End: 2024-04-09
Payer: COMMERCIAL

## 2024-04-09 VITALS
HEART RATE: 96 BPM | DIASTOLIC BLOOD PRESSURE: 84 MMHG | WEIGHT: 188 LBS | BODY MASS INDEX: 29.44 KG/M2 | SYSTOLIC BLOOD PRESSURE: 138 MMHG

## 2024-04-09 DIAGNOSIS — M54.12 CERVICAL NEURITIS: ICD-10-CM

## 2024-04-09 DIAGNOSIS — M47.812 ARTHRITIS OF FACET JOINT OF CERVICAL SPINE: ICD-10-CM

## 2024-04-09 DIAGNOSIS — M75.42 IMPINGEMENT SYNDROME OF LEFT SHOULDER: Primary | ICD-10-CM

## 2024-04-09 DIAGNOSIS — M50.20 DISCOGENIC SYNDROME, CERVICAL: ICD-10-CM

## 2024-04-09 DIAGNOSIS — M75.101 TEAR OF RIGHT SUPRASPINATUS TENDON: ICD-10-CM

## 2024-04-09 PROCEDURE — 99214 OFFICE O/P EST MOD 30 MIN: CPT | Performed by: PHYSICIAN ASSISTANT

## 2024-04-09 RX ORDER — TOPIRAMATE 25 MG/1
225-250 TABLET ORAL DAILY
Qty: 900 TABLET | Refills: 0 | Status: SHIPPED | OUTPATIENT
Start: 2024-04-09 | End: 2024-05-14 | Stop reason: SDUPTHER

## 2024-04-09 ASSESSMENT — ENCOUNTER SYMPTOMS
PSYCHIATRIC NEGATIVE: 1
ARTHRALGIAS: 1
NECK PAIN: 1
CARDIOVASCULAR NEGATIVE: 1
EYES NEGATIVE: 1
ENDOCRINE NEGATIVE: 1
RESPIRATORY NEGATIVE: 1
ALLERGIC/IMMUNOLOGIC NEGATIVE: 1
CONSTITUTIONAL NEGATIVE: 1
HEMATOLOGIC/LYMPHATIC NEGATIVE: 1
NUMBNESS: 1
HEADACHES: 1
WEAKNESS: 1
NECK STIFFNESS: 1
GASTROINTESTINAL NEGATIVE: 1
MYALGIAS: 1

## 2024-04-09 NOTE — PROGRESS NOTES
Subjective   Patient ID: Michelle Shi is a 53 y.o. female who presents for Neck Pain (ONGOING NECK PAIN, SHE FEELS HER NECK IS GETTING WORSE, THE PAIN WHEN SHE GETS HEADACHES FEELS LIKE  A BRUISE WITH PAIN INTO THE LEFT ARM INTO THE LEFT SHOULDER, ). SHE HAS LEFT SHOULDER PAIN AND HAD SURGERY WAS SUPPOSED TO HAVE ANOTHER SURGERY WITH DR. CHICAS BUT HE LEFT, SHE ISN'T SURE IF THE PAIN IS COMING FROM THE SHOULDER CAUSING THE HEADACHES TO FEEL WORSE OR NOT LIKE SHE'S BEEN HIT IN THE BACK OF THE HEAD AND BRUISED, SHE IS GETTING THE HEADACHES 3-4 TIMES A WEEK, SHE JUST HAD ONCE THAT LASTED 2 DAYS SHE HAS TO LAY IN BED IN THE DARK FACE DOWN SHE CANNOT TOUCH THE BACK OF HER HEAD, THE TOPIRIMATE IS CONTROLLING THE REGULAR HEADACHES AND THE BEE HIVE ARM FEELING, SHE WOULD LIKE REFILL , SHE HAS OLD SCRIPT OF NORCO FROM DR. CHICAS TAKES PRN, PAIN SCORE 10/10, LAVON=50%, ORT=0    Chantell Terrazas CMA 04/09/24 10:32 AM     Patient is a 53-year-old female.  She presents today for a 3-month medication management follow-up.  At this time, she continues to have neck pain with left greater than right arm pain as well as numbness and tingling.  The right side goes down to her elbow.  The left side goes down into her hand.  She states is a 10/10.  She has difficulty doing things throughout the day.  Her left arm is weak.  She has issues with overhead activities and difficulty with certain maneuvers but she states that her left arm is just miserable.  She has been doing her home exercise program that she was taught by the orthopedic surgeon as well as by the physical therapist.  This is for her shoulder and neck, Tylenol gives slight relief, and she is using Topamax.  She tolerates the Topamax and it helps her.  It is not perfect but she states that it is better than before.    She has previously seen Dr. Espinoza who talked to her about having a fusion on her neck.  She has also been previously told she should have another shoulder surgery.   She is just not sure what she should do to try to get the relief she is looking for.  She wants to get some sort of improvement.    She canceled her last appointment with her surgeon because she states that she called and they urged her to have surgery and she just is not sure that she wants to do.        Review of Systems   Constitutional: Negative.    HENT: Negative.     Eyes: Negative.    Respiratory: Negative.     Cardiovascular: Negative.    Gastrointestinal: Negative.    Endocrine: Negative.    Genitourinary: Negative.    Musculoskeletal:  Positive for arthralgias, myalgias, neck pain and neck stiffness.   Skin: Negative.    Allergic/Immunologic: Negative.    Neurological:  Positive for weakness, numbness and headaches.   Hematological: Negative.    Psychiatric/Behavioral: Negative.         Objective   Physical Exam  Vitals and nursing note reviewed.   Constitutional:       Appearance: Normal appearance.   HENT:      Head: Normocephalic and atraumatic.      Right Ear: External ear normal.      Left Ear: External ear normal.      Nose: Nose normal.      Mouth/Throat:      Pharynx: Oropharynx is clear.   Eyes:      Conjunctiva/sclera: Conjunctivae normal.   Cardiovascular:      Rate and Rhythm: Normal rate and regular rhythm.      Pulses: Normal pulses.   Pulmonary:      Effort: Pulmonary effort is normal.   Musculoskeletal:         General: Normal range of motion.      Cervical back: Normal range of motion.      Comments: 5/5 upper extremity strength on the right side  Left side 4/5 with difficulty with overhead activity and movement of the left shoulder  Has her arm in a neutral position during the appointment   Skin:     General: Skin is warm and dry.   Neurological:      General: No focal deficit present.      Mental Status: She is alert and oriented to person, place, and time. Mental status is at baseline.   Psychiatric:         Mood and Affect: Mood normal.         Behavior: Behavior normal.          Thought Content: Thought content normal.         Judgment: Judgment normal.         MR shoulder  Status: Final result     PACS Images     Show images for MR shoulder  Signed by    Signed Time Phone Pager   Tylor Luis MD 7/13/2022 11:57 [phone]     Exam Information    Status Exam Begun Exam Ended   Final 7/12/2022 19:40 7/12/2022 20:20     Study Result    Narrative & Impression   MRN: [id number]  Patient Name: DON MORILLO     STUDY:  MRI of the  left shoulder without IV contrast;  7/12/2022 8:20 pm     INDICATION:  left shoulder pain  M25.512: Left shoulder pain.     COMPARISON:  MRI dated 02/08/2022     ACCESSION NUMBER(S):  [id number]     ORDERING CLINICIAN:  DANIEL CHICAS     TECHNIQUE:  MR imaging of the  left shoulder was obtained  without IV contrast.     FINDINGS:  ROTATOR CUFF TENDONS:  There is redemonstration of thickening of the supraspinatus and  infraspinatus tendons with increased intrasubstance signal, mildly  progressed from the prior examination and most consistent with  moderate supraspinatus and mild infraspinatus tendinosis. The teres  minor tendon is intact. The subscapularis tendon is intact.  There is mild supraspinatus muscle atrophy without edema.     BICEPS TENDON AND ROTATOR INTERVAL:  The intra-articular portion of the long head biceps tendon is mildly  thickened and edematous consistent with a mild tendinosis. The  rotator interval is unremarkable.     JOINTS:  There are moderate acromioclavicular productive degenerative changes  with capsular distension, small joint effusion, and marrow edema.  There obliteration of the underlying subacromial fat plane.     There is moderate glenohumeral articular cartilage loss.     There is no sizable glenohumeral joint effusion. There is a small  volume of fluid and edema in the subacromial subdeltoid bursa.     LABRUM:  There is mild circumferential truncation of the glenoid labrum  without linear tearing     OSSEOUS STRUCTURES:  No focal  marrow replacing lesions are identified. There is no  fracture.     SOFT TISSUES:  The suprascapular nerve is intact at the suprascapular and  spinoglenoid notches.     IMPRESSION:  1. Moderate supraspinatus and mild infraspinatus tendinosis without  tear.  2. Mild supraspinatus muscle atrophy.  3. Mild intra-articular long head biceps tendinosis.  4. Moderate acromioclavicular and glenohumeral osteoarthrosis. There  are findings which may reflect external impingement in the  appropriate clinical scenario.  5. Small volume fluid in the subacromial subdeltoid bursa. Correlate  with concern for bursitis.     MR CERVICAL SPINE WO IV CONTRAST  Status: Final result     PACS Images     Show images for MR CERVICAL SPINE WO IV CONTRAST  Signed by    Signed Time Phone Pager   Cyndee Gordon MD 2/21/2023 16:48  [phone]     Exam Information    Status Exam Begun Exam Ended   Final 2/20/2023 13:54      Study Result    Narrative   Interpreted By:  CYNDEE GORDON MD  MRN: [id number]  Patient Name: DON MORILLO     STUDY:  MRI CERVICAL WO; ;  2/21/2023 4:25 pm     INDICATION:  neck pain ,arm pain and head aches  M54.2: Neck pain, chronic  M47.812: Arthritis of facet joint of cervical spine G89.29:.     COMPARISON:  02/01/2018     ACCESSION NUMBER(S):  [id number]     ORDERING CLINICIAN:  KERI ORNELAS     TECHNIQUE:  Multiplanar, multisequence imaging of the cervical spine was  performed without intravenous contrast administration.     FINDINGS:  Cervicomedullary junction is unremarkable. Spinal cord shows normal  signal intensity and caliber.     Vertebral body heights are well maintained. Bone marrow signal  intensity is normal. Mild anterolisthesis of C7 on T1, trace  anterolisthesis of T1 on T2. Mild degenerative changes in the  atlantoaxial joint.     At C2-C3 no spinal canal or neural foramina stenosis.     At C3-C4 mild disc bulge noted causing minimal indentation of the  anterior thecal sac. Minimal uncovertebral  degeneration noted without  any spinal canal or neural foramina stenosis.     At C4-C5 mild degenerative disc bulge noted, with minimal osteophyte  formation noted causing mild indentation of the anterior thecal sac,  uncovertebral degeneration noted causing mild bilateral neural  foramina narrowing.     At C5-C6 mild disc osteophyte noted causing mild indentation of the  anterior thecal sac, uncovertebral degeneration noted causing severe  left neural foramina, moderate to severe right neural foramina  narrowing.     At C6-C7 mild degenerative disc bulge noted causing mild indentation  of the anterior thecal sac. No spinal canal or neural foramina  stenosis.     At C7-T1 right-sided perineural cysts noted. No spinal canal or  neural foramina stenosis.      Impression   At C5-C6 degenerative changes noted causing severe left neural  foramina, moderate to severe right neural foramina narrowing.     At C4-C5, degenerative changes noted causing mild bilateral neural  foramina narrowing, right greater than the left.     Assessment/Plan   Diagnoses and all orders for this visit:  Impingement syndrome of left shoulder  -     MR shoulder left wo IV contrast; Future  Tear of right supraspinatus tendon  -     MR shoulder left wo IV contrast; Future  Cervical neuritis  -     MR cervical spine wo IV contrast; Future  -     topiramate (Topamax) 25 mg tablet; Take 9-10 tablets (225-250 mg) by mouth once daily. Take 4-5 in the morning and 4-5 in the evening. No more than 10 a day.  Discogenic syndrome, cervical  -     MR cervical spine wo IV contrast; Future  -     topiramate (Topamax) 25 mg tablet; Take 9-10 tablets (225-250 mg) by mouth once daily. Take 4-5 in the morning and 4-5 in the evening. No more than 10 a day.  Arthritis of facet joint of cervical spine  -     topiramate (Topamax) 25 mg tablet; Take 9-10 tablets (225-250 mg) by mouth once daily. Take 4-5 in the morning and 4-5 in the evening. No more than 10 a  day.       Patient is a 53-year-old female with a past medical history significant for cervical neuritis, cervical spondylosis and left shoulder pain and arthritis.  We reviewed her previous MRIs which are both over a-year-old.  Patient states that she is just torn as to what to do.  She is wants to get some relief.  She has neck pain, headaches and left greater than right arm pain that she rates a 10/10.  This affects her ability to get comfort.  This affects her quality of life.  This affects her activities.  At this time she is going to continue on the Topamax and we discussed obtaining updated cervical MRI scan as well as left shoulder MRI scan to discuss possible injection options versus surgical consultation.  We did discuss the possibility of cervical epidural steroid injection but at this time, we will start with obtaining updated advanced imaging and follow-up after.

## 2024-04-18 DIAGNOSIS — M75.42 IMPINGEMENT SYNDROME OF LEFT SHOULDER: Primary | ICD-10-CM

## 2024-04-22 ENCOUNTER — HOSPITAL ENCOUNTER (OUTPATIENT)
Dept: RADIOLOGY | Facility: HOSPITAL | Age: 54
Discharge: HOME | End: 2024-04-22
Payer: COMMERCIAL

## 2024-04-22 DIAGNOSIS — M75.42 IMPINGEMENT SYNDROME OF LEFT SHOULDER: ICD-10-CM

## 2024-04-22 PROCEDURE — 73030 X-RAY EXAM OF SHOULDER: CPT | Mod: LEFT SIDE | Performed by: RADIOLOGY

## 2024-04-22 PROCEDURE — 73030 X-RAY EXAM OF SHOULDER: CPT | Mod: LT

## 2024-05-01 ENCOUNTER — HOSPITAL ENCOUNTER (OUTPATIENT)
Dept: RADIOLOGY | Facility: HOSPITAL | Age: 54
Discharge: HOME | End: 2024-05-01
Payer: COMMERCIAL

## 2024-05-01 DIAGNOSIS — M50.20 DISCOGENIC SYNDROME, CERVICAL: ICD-10-CM

## 2024-05-01 DIAGNOSIS — M75.101 TEAR OF RIGHT SUPRASPINATUS TENDON: ICD-10-CM

## 2024-05-01 DIAGNOSIS — M54.12 CERVICAL NEURITIS: ICD-10-CM

## 2024-05-01 DIAGNOSIS — M75.42 IMPINGEMENT SYNDROME OF LEFT SHOULDER: ICD-10-CM

## 2024-05-01 PROCEDURE — 72141 MRI NECK SPINE W/O DYE: CPT | Performed by: RADIOLOGY

## 2024-05-01 PROCEDURE — 73221 MRI JOINT UPR EXTREM W/O DYE: CPT | Mod: LT

## 2024-05-01 PROCEDURE — 72141 MRI NECK SPINE W/O DYE: CPT

## 2024-05-01 PROCEDURE — 73221 MRI JOINT UPR EXTREM W/O DYE: CPT | Mod: LEFT SIDE | Performed by: RADIOLOGY

## 2024-05-14 ENCOUNTER — OFFICE VISIT (OUTPATIENT)
Dept: PAIN MEDICINE | Facility: CLINIC | Age: 54
End: 2024-05-14
Payer: COMMERCIAL

## 2024-05-14 VITALS
HEART RATE: 91 BPM | RESPIRATION RATE: 16 BRPM | WEIGHT: 189 LBS | SYSTOLIC BLOOD PRESSURE: 130 MMHG | BODY MASS INDEX: 29.6 KG/M2 | DIASTOLIC BLOOD PRESSURE: 84 MMHG

## 2024-05-14 DIAGNOSIS — M75.42 IMPINGEMENT SYNDROME OF LEFT SHOULDER: ICD-10-CM

## 2024-05-14 DIAGNOSIS — M54.12 CERVICAL NEURITIS: ICD-10-CM

## 2024-05-14 DIAGNOSIS — M47.812 ARTHRITIS OF FACET JOINT OF CERVICAL SPINE: ICD-10-CM

## 2024-05-14 DIAGNOSIS — M50.20 DISCOGENIC SYNDROME, CERVICAL: ICD-10-CM

## 2024-05-14 DIAGNOSIS — M75.101 TEAR OF RIGHT SUPRASPINATUS TENDON: ICD-10-CM

## 2024-05-14 DIAGNOSIS — M79.7 FIBROMYALGIA: Primary | ICD-10-CM

## 2024-05-14 PROCEDURE — 99214 OFFICE O/P EST MOD 30 MIN: CPT | Performed by: PHYSICIAN ASSISTANT

## 2024-05-14 RX ORDER — TOPIRAMATE 25 MG/1
225-250 TABLET ORAL DAILY
Qty: 900 TABLET | Refills: 0 | Status: SHIPPED | OUTPATIENT
Start: 2024-05-14 | End: 2024-08-12

## 2024-05-14 ASSESSMENT — ENCOUNTER SYMPTOMS
ARTHRALGIAS: 1
WEAKNESS: 1
ENDOCRINE NEGATIVE: 1
NECK PAIN: 1
CONSTITUTIONAL NEGATIVE: 1
GASTROINTESTINAL NEGATIVE: 1
RESPIRATORY NEGATIVE: 1
HEMATOLOGIC/LYMPHATIC NEGATIVE: 1
EYES NEGATIVE: 1
PSYCHIATRIC NEGATIVE: 1
CARDIOVASCULAR NEGATIVE: 1
ALLERGIC/IMMUNOLOGIC NEGATIVE: 1
MYALGIAS: 1
NUMBNESS: 1

## 2024-05-14 NOTE — PROGRESS NOTES
Subjective   Patient ID: Michelle Shi is a 53 y.o. female who presents for Neck Pain (FUV for MRI review results. Today she is having pain in her LT neck and lt shoulder rates her pain 3-4/10 after Tylenol and 6-10/10 at its worst, describes as aching, sharp, stabbing and pressure. Her pain increases with movement, in one position too long or any activity. She is taking tylenol , Topamax and she will take Norco sparing, Ice, heat, HEP and stretching with  bands she reports these do not help her pain much. ) LAVON score 52%.     Christy Patten RN 05/14/24 8:57 AM     Patient is a 53-year-old female.  She presents today for a follow-up after undergoing MRI scans.  At this time, she continues to have neck pain with left greater than right arm pain as well as numbness and tingling.  The left side is more bothersome than the right side.  She rates it a 3-10/10.  She takes Tylenol and Topamax.  This gives her some improvement.  She tolerates this well.  She has been doing her home exercise program that she was taught by the orthopedic surgeon as well as by the physical therapist. She has previously seen Dr. Espinoza who talked to her about having a fusion on her neck.  She has also been previously told she should have another shoulder surgery.  She refuses injections.  She states that this just not something she wants to do.  She is getting ready to see the spine surgeon.        Review of Systems   Constitutional: Negative.    HENT: Negative.     Eyes: Negative.    Respiratory: Negative.     Cardiovascular: Negative.    Gastrointestinal: Negative.    Endocrine: Negative.    Genitourinary: Negative.    Musculoskeletal:  Positive for arthralgias, myalgias and neck pain.   Skin: Negative.    Allergic/Immunologic: Negative.    Neurological:  Positive for weakness and numbness.   Hematological: Negative.    Psychiatric/Behavioral: Negative.         Objective   Physical Exam  Vitals and nursing note reviewed.   Constitutional:        Appearance: Normal appearance.   HENT:      Head: Normocephalic and atraumatic.      Right Ear: External ear normal.      Left Ear: External ear normal.      Nose: Nose normal.      Mouth/Throat:      Pharynx: Oropharynx is clear.   Eyes:      Conjunctiva/sclera: Conjunctivae normal.   Cardiovascular:      Rate and Rhythm: Normal rate and regular rhythm.      Pulses: Normal pulses.   Pulmonary:      Effort: Pulmonary effort is normal.   Musculoskeletal:         General: Normal range of motion.      Cervical back: Normal range of motion.      Comments: 5/5 upper extremity strength other than left deltoid 4/5  Pain with internal and external rotation of left shoulder  Negative Maria Elena's   Skin:     General: Skin is warm and dry.   Neurological:      General: No focal deficit present.      Mental Status: She is alert and oriented to person, place, and time. Mental status is at baseline.   Psychiatric:         Mood and Affect: Mood normal.         Behavior: Behavior normal.         Thought Content: Thought content normal.         Judgment: Judgment normal.       MR shoulder left wo IV contrast  Status: Final result     PACS Images     Show images for MR shoulder left wo IV contrast  Signed by    Signed Time Phone Pager   Jordin Johnston MD 5/02/2024 09:46 [phone]     Exam Information    Status Exam Begun Exam Ended   Final 5/01/2024 17:36 5/01/2024 18:21     Study Result    Narrative & Impression   Interpreted By:  Jordin Johnston,   STUDY:  MRI of the left shoulder  without intravenous contrast date 5/1/2024.      INDICATION:  Signs/Symptoms:LEFT SHOULDER PAIN      COMPARISON:  None.      ACCESSION NUMBER(S):  [id number]      ORDERING CLINICIAN:  KERI ORNELAS      TECHNIQUE:  Multiplanar multisequence MRI of the  left shoulder was performed  without intra-articular or intravenous contrast.      FINDINGS:  MUSCLES AND TENDONS:      There is mild increased intermediate signal intensity within  the  distal to insertional supraspinatus and infraspinatus tendons. The  infraspinatus tendon is intact.  The teres minor tendon is intact.  The subscapularis tendon is intact.  There are findings compatible  with prior biceps tenotomy with subpectoral tenodesis. As can be seen  at image 24 of the axial 19 of the sagittal planes there is partial  backing out of the anchor. There also appears to be some intermediate  signal intensity adjacent to the anchor. no significant rotator cuff  muscle atrophy is evident.  No mass is evident and the suprascapular  or spinoglenoid notch or the quadrangular space.      OSSEOUS STRUCTURES AND JOINTS:      No displaced glenoid labral tear is evident. There is  mild  degenerative change of the glenohumeral joint. There is  moderate  degenerative change of the acromioclavicular joint.   The acromion is  type II  without significant lateral downsloping.   There is a trace  volume glenohumeral and acromioclavicular joint effusion.      No fracture or dislocation is evident. Cystic changes are seen at the  greater tubercle of the humerus. Bone marrow signal intensity is  otherwise within normal limits.      SOFT TISSUES:      There is no significant volume of fluid in the subacromial subdeltoid  bursa.  There is no significant volume of fluid in the subcoracoid  bursa. Associated soft tissues of the shoulder are grossly  unremarkable.      IMPRESSION:  1. Partial backing out of the anchor from prior biceps tenodesis.  2. Mild supraspinatus infraspinatus tendinosis.  3. Mild degenerative change of the shoulder.      Signed by: Jordin Johnston 5/2/2024 9:46 AM  Dictation workstation:   [id number]     Result History    MR shoulder left wo IV contrast (Order #[id number]) on 5/2/2024 - Order Result History Report    MR cervical spine wo IV contrast  Status: Final result     PACS Images     Show images for MR cervical spine wo IV contrast  Signed by    Signed Time Phone Pager   Gali HOUGH  DO Monae 5/02/2024 07:34 [phone]     Exam Information    Status Exam Begun Exam Ended   Final 5/01/2024 17:36 5/01/2024 18:37     Study Result    Narrative & Impression   Interpreted By:  Gali Licona,   STUDY:  MR CERVICAL SPINE WO IV CONTRAST;  5/1/2024 6:37 pm      INDICATION:  Signs/Symptoms:NECK AND ARM PAIN, NUMBNESS, AND WEAKNESS.      COMPARISON:  02/21/2023      ACCESSION NUMBER(S):  [id number]      ORDERING CLINICIAN:  KERI ORNELAS      TECHNIQUE:  Sagittal T1, T2, STIR, axial T1 and axial T2 weighted images were  acquired through the cervical spine.      FINDINGS:  Alignment: There is again subtle, grade 1 anterolisthesis of C2 on C3  and C7 on T1.      Vertebrae/Intervertebral Discs: The vertebral bodies demonstrate  expected height.  There is an ovoid focus of increased signal on T1  and T2 weighted imaging within C7 posteriorly favored to represent a  hemangioma. Mild degenerative endplate signal changes are noted at  C5-6. There is multilevel disc desiccation and endplate spurring.      Cord: The cervical cord is somewhat degraded by artifact. No definite  intrinsic signal abnormality is identified within it.      The cerebellar tonsils again extend up to and slightly below the  level of the foramen magnum.      C1-C2: The cervicomedullary junction appears unremarkable. There is  no central canal stenosis. C2-C3: There is no posterior disc contour  abnormality. There is no significant central canal or neural  foraminal stenosis. C3-C4: There is again mild disc bulging without  central canal or neuroforaminal stenosis. There is mild uncovertebral  joint hypertrophy, left greater than right. The findings are similar  from the previous exam. C4-C5: There is again disc bulging without  significant central canal stenosis. Facet and uncovertebral joint  hypertrophy contribute to mild neuroforaminal narrowing. The findings  are similar from the previous exam. C5-C6: Posterior  disc/osteophyte  complex does not significantly narrow the spinal canal. Facet and  uncovertebral joint hypertrophy contribute to severe left and  moderate right-sided neuroforaminal narrowing, similar from the  previous exam. C6-C7: There is again a central disc protrusion  without significant central canal stenosis. The neuroforamina are  patent. C7-T1: There is no posterior disc contour abnormality. Small  perineural root sleeve cysts are again noted on the right. There is  no central canal or neuroforaminal stenosis.      The prevertebral and posterior paraspinous soft tissues are within  normal limits.      IMPRESSION:  Redemonstration of multilevel degenerative changes of the cervical  spine, overall very similar from 02/21/2023.      MACRO:  None      Signed by: Gali Licona 5/2/2024 7:34 AM  Dictation workstation:   [id number]     Assessment/Plan   Diagnoses and all orders for this visit:  Fibromyalgia  Cervical neuritis  -     topiramate (Topamax) 25 mg tablet; Take 9-10 tablets (225-250 mg) by mouth once daily. Take 4-5 in the morning and 4-5 in the evening. No more than 10 a day.  Discogenic syndrome, cervical  -     topiramate (Topamax) 25 mg tablet; Take 9-10 tablets (225-250 mg) by mouth once daily. Take 4-5 in the morning and 4-5 in the evening. No more than 10 a day.  Arthritis of facet joint of cervical spine  -     topiramate (Topamax) 25 mg tablet; Take 9-10 tablets (225-250 mg) by mouth once daily. Take 4-5 in the morning and 4-5 in the evening. No more than 10 a day.  Impingement syndrome of left shoulder  Tear of right supraspinatus tendon       Patient is a 53-year-old female with the above-mentioned medical diagnoses.  She is here today and we reviewed the MRIs.  We reviewed the films and the reports together.  Questions and concerns were all discussed.  I recommended to patient a cervical epidural as well as a shoulder injection.  She declines both.  She states that injections are just  not something that she wants to do.  She has always refused them.  She is getting ready to see the spine surgeon.  She would like to see them and discuss her options.  I once again encouraged her to reconsider the injections and she stated she would think about it and let me know.  She will continue on the Topamax.  Refill sent to the pharmacy.  Follow-up in 3 months.  Call the clinic sooner should she desire a different treatment plan.

## 2024-06-07 ENCOUNTER — APPOINTMENT (OUTPATIENT)
Dept: PRIMARY CARE | Facility: CLINIC | Age: 54
End: 2024-06-07
Payer: COMMERCIAL

## 2024-08-13 ENCOUNTER — OFFICE VISIT (OUTPATIENT)
Dept: PAIN MEDICINE | Facility: CLINIC | Age: 54
End: 2024-08-13
Payer: COMMERCIAL

## 2024-08-13 VITALS
DIASTOLIC BLOOD PRESSURE: 86 MMHG | BODY MASS INDEX: 29.44 KG/M2 | WEIGHT: 188 LBS | SYSTOLIC BLOOD PRESSURE: 130 MMHG | HEART RATE: 84 BPM | RESPIRATION RATE: 16 BRPM

## 2024-08-13 DIAGNOSIS — M79.7 FIBROMYALGIA: Primary | ICD-10-CM

## 2024-08-13 DIAGNOSIS — M54.12 CERVICAL NEURITIS: ICD-10-CM

## 2024-08-13 DIAGNOSIS — M54.2 NECK PAIN, CHRONIC: ICD-10-CM

## 2024-08-13 DIAGNOSIS — M75.101 TEAR OF RIGHT SUPRASPINATUS TENDON: ICD-10-CM

## 2024-08-13 DIAGNOSIS — M75.42 IMPINGEMENT SYNDROME OF LEFT SHOULDER: ICD-10-CM

## 2024-08-13 DIAGNOSIS — M47.812 ARTHRITIS OF FACET JOINT OF CERVICAL SPINE: ICD-10-CM

## 2024-08-13 DIAGNOSIS — G89.29 NECK PAIN, CHRONIC: ICD-10-CM

## 2024-08-13 DIAGNOSIS — M50.20 DISCOGENIC SYNDROME, CERVICAL: ICD-10-CM

## 2024-08-13 DIAGNOSIS — M50.30 DEGENERATION OF CERVICAL INTERVERTEBRAL DISC: ICD-10-CM

## 2024-08-13 DIAGNOSIS — M47.812 CERVICAL SPONDYLOSIS: ICD-10-CM

## 2024-08-13 PROCEDURE — 99214 OFFICE O/P EST MOD 30 MIN: CPT | Performed by: PHYSICIAN ASSISTANT

## 2024-08-13 RX ORDER — TOPIRAMATE 25 MG/1
225-250 TABLET ORAL DAILY
Qty: 900 TABLET | Refills: 0 | Status: SHIPPED | OUTPATIENT
Start: 2024-08-13 | End: 2024-11-11

## 2024-08-13 ASSESSMENT — ENCOUNTER SYMPTOMS
WEAKNESS: 1
ARTHRALGIAS: 1
ENDOCRINE NEGATIVE: 1
ALLERGIC/IMMUNOLOGIC NEGATIVE: 1
NECK STIFFNESS: 1
CARDIOVASCULAR NEGATIVE: 1
RESPIRATORY NEGATIVE: 1
NUMBNESS: 1
PSYCHIATRIC NEGATIVE: 1
HEMATOLOGIC/LYMPHATIC NEGATIVE: 1
NECK PAIN: 1
MYALGIAS: 1
CONSTITUTIONAL NEGATIVE: 1
GASTROINTESTINAL NEGATIVE: 1
EYES NEGATIVE: 1

## 2024-08-13 ASSESSMENT — COLUMBIA-SUICIDE SEVERITY RATING SCALE - C-SSRS
1. IN THE PAST MONTH, HAVE YOU WISHED YOU WERE DEAD OR WISHED YOU COULD GO TO SLEEP AND NOT WAKE UP?: NO
6. HAVE YOU EVER DONE ANYTHING, STARTED TO DO ANYTHING, OR PREPARED TO DO ANYTHING TO END YOUR LIFE?: NO
2. HAVE YOU ACTUALLY HAD ANY THOUGHTS OF KILLING YOURSELF?: NO

## 2024-08-13 NOTE — PROGRESS NOTES
Subjective   Patient ID: Michelle Shi is a 54 y.o. female who presents for No chief complaint on file..  HPI    Review of Systems    Objective   Physical Exam    Assessment/Plan   {Assess/PlanSmartLinks:83102}         Kassy Fishman RN 08/13/24 9:13 AM

## 2024-08-13 NOTE — PROGRESS NOTES
Subjective   Patient ID: Michelle Shi is a 54 y.o. female who presents for Pain (FUV for amy  neck pain with radiation down L arm to and including hand today. Pain score is 5/10 today. Pain is described as a sharp, throbbing constant pain. Looking side to side and bending forward will cause or increase the pain. Nothing helps relieve the pain. Rx for Topamax needed. LAVON = 52/100. SOAPP = 2.).  HPI    Review of Systems    Objective   Physical Exam    Assessment/Plan   {Assess/PlanSmartLinks:86835}         Kassy Fishman RN 08/13/24 9:20 AM

## 2024-08-13 NOTE — PROGRESS NOTES
Subjective   Patient ID: Michelle Shi is a 54 y.o. female who presents for Pain (FUV for amy  neck pain with radiation down L arm to and including hand today. Pain score is 5/10 today. Pain is described as a sharp, throbbing constant pain. Looking side to side and bending forward will cause or increase the pain. Nothing helps relieve the pain. Rx for Topamax needed. LAVON = 52/100. SOAPP = 2.).    Kassy Fishman RN 08/13/24 9:25 AM     Patient is a 53-year-old female.  She presents today for a follow-up after continuing on the Topamax.  She has previously undergone updated MRI scans.  At this time, she continues to have neck pain with left greater than right arm pain as well as numbness and tingling.  The left side goes down into her hand.  The right side goes down to her elbow.  She rates her discomfort a 5/10.  She takes Tylenol and Topamax.  This gives her some improvement.  She tolerates this well. She has been doing her home exercise program that she was taught by the orthopedic surgeon as well as by the physical therapist. She has previously seen Dr. Espinoza who talked to her about having a fusion on her neck.  She states that she had to cancel an appointment because of pain lumbar diet and now they will no longer see her.        Review of Systems   Constitutional: Negative.    HENT: Negative.     Eyes: Negative.    Respiratory: Negative.     Cardiovascular: Negative.    Gastrointestinal: Negative.    Endocrine: Negative.    Genitourinary: Negative.    Musculoskeletal:  Positive for arthralgias, myalgias, neck pain and neck stiffness.   Skin: Negative.    Allergic/Immunologic: Negative.    Neurological:  Positive for weakness and numbness.   Hematological: Negative.    Psychiatric/Behavioral: Negative.         Objective   Physical Exam  Vitals and nursing note reviewed.   Constitutional:       General: She is not in acute distress.     Appearance: Normal appearance. She is not ill-appearing.   HENT:      Head:  Normocephalic and atraumatic.      Right Ear: External ear normal.      Left Ear: External ear normal.      Nose: Nose normal.      Mouth/Throat:      Pharynx: Oropharynx is clear.   Eyes:      Conjunctiva/sclera: Conjunctivae normal.   Cardiovascular:      Rate and Rhythm: Normal rate and regular rhythm.      Pulses: Normal pulses.   Pulmonary:      Effort: Pulmonary effort is normal.      Breath sounds: Normal breath sounds.   Musculoskeletal:         General: Normal range of motion.      Cervical back: Normal range of motion.      Comments: 5/5 upper extremity strength other than left deltoid 4/5  Pain with internal and external rotation of left shoulder  Negative Maria Elena's      Skin:     General: Skin is warm and dry.   Neurological:      General: No focal deficit present.      Mental Status: She is alert and oriented to person, place, and time. Mental status is at baseline.   Psychiatric:         Mood and Affect: Mood normal.         Behavior: Behavior normal.         Thought Content: Thought content normal.         Judgment: Judgment normal.         MR cervical spine wo IV contrast  Status: Final result     PACS Images     Show images for MR cervical spine wo IV contrast  Signed by    Signed Time Phone Pager   Gali Licona DO 5/02/2024 07:34 [phone]     Exam Information    Status Exam Begun Exam Ended   Final 5/01/2024 17:36 5/01/2024 18:37     Study Result    Narrative & Impression   Interpreted By:  Gali Licona,   STUDY:  MR CERVICAL SPINE WO IV CONTRAST;  5/1/2024 6:37 pm      INDICATION:  Signs/Symptoms:NECK AND ARM PAIN, NUMBNESS, AND WEAKNESS.      COMPARISON:  02/21/2023      ACCESSION NUMBER(S):  [id number]      ORDERING CLINICIAN:  KERI ORNELAS      TECHNIQUE:  Sagittal T1, T2, STIR, axial T1 and axial T2 weighted images were  acquired through the cervical spine.      FINDINGS:  Alignment: There is again subtle, grade 1 anterolisthesis of C2 on C3  and C7 on T1.       Vertebrae/Intervertebral Discs: The vertebral bodies demonstrate  expected height.  There is an ovoid focus of increased signal on T1  and T2 weighted imaging within C7 posteriorly favored to represent a  hemangioma. Mild degenerative endplate signal changes are noted at  C5-6. There is multilevel disc desiccation and endplate spurring.      Cord: The cervical cord is somewhat degraded by artifact. No definite  intrinsic signal abnormality is identified within it.      The cerebellar tonsils again extend up to and slightly below the  level of the foramen magnum.      C1-C2: The cervicomedullary junction appears unremarkable. There is  no central canal stenosis. C2-C3: There is no posterior disc contour  abnormality. There is no significant central canal or neural  foraminal stenosis. C3-C4: There is again mild disc bulging without  central canal or neuroforaminal stenosis. There is mild uncovertebral  joint hypertrophy, left greater than right. The findings are similar  from the previous exam. C4-C5: There is again disc bulging without  significant central canal stenosis. Facet and uncovertebral joint  hypertrophy contribute to mild neuroforaminal narrowing. The findings  are similar from the previous exam. C5-C6: Posterior disc/osteophyte  complex does not significantly narrow the spinal canal. Facet and  uncovertebral joint hypertrophy contribute to severe left and  moderate right-sided neuroforaminal narrowing, similar from the  previous exam. C6-C7: There is again a central disc protrusion  without significant central canal stenosis. The neuroforamina are  patent. C7-T1: There is no posterior disc contour abnormality. Small  perineural root sleeve cysts are again noted on the right. There is  no central canal or neuroforaminal stenosis.      The prevertebral and posterior paraspinous soft tissues are within  normal limits.      IMPRESSION:  Redemonstration of multilevel degenerative changes of the  cervical  spine, overall very similar from 02/21/2023.      MACRO:  None      Signed by: Gali Licona 5/2/2024 7:34 AM  Dictation workstation:   [id number]     MR shoulder left wo IV contrast  Status: Final result     PACS Images     Show images for MR shoulder left wo IV contrast  Signed by    Signed Time Phone Pager   Jordin Johnston MD 5/02/2024 09:46 [phone]     Exam Information    Status Exam Begun Exam Ended   Final 5/01/2024 17:36 5/01/2024 18:21     Study Result    Narrative & Impression   Interpreted By:  Jordin Johnston,   STUDY:  MRI of the left shoulder  without intravenous contrast date 5/1/2024.      INDICATION:  Signs/Symptoms:LEFT SHOULDER PAIN      COMPARISON:  None.      ACCESSION NUMBER(S):  [id number]      ORDERING CLINICIAN:  KERI ORNELAS      TECHNIQUE:  Multiplanar multisequence MRI of the  left shoulder was performed  without intra-articular or intravenous contrast.      FINDINGS:  MUSCLES AND TENDONS:      There is mild increased intermediate signal intensity within the  distal to insertional supraspinatus and infraspinatus tendons. The  infraspinatus tendon is intact.  The teres minor tendon is intact.  The subscapularis tendon is intact.  There are findings compatible  with prior biceps tenotomy with subpectoral tenodesis. As can be seen  at image 24 of the axial 19 of the sagittal planes there is partial  backing out of the anchor. There also appears to be some intermediate  signal intensity adjacent to the anchor. no significant rotator cuff  muscle atrophy is evident.  No mass is evident and the suprascapular  or spinoglenoid notch or the quadrangular space.      OSSEOUS STRUCTURES AND JOINTS:      No displaced glenoid labral tear is evident. There is  mild  degenerative change of the glenohumeral joint. There is  moderate  degenerative change of the acromioclavicular joint.   The acromion is  type II  without significant lateral downsloping.   There is a  trace  volume glenohumeral and acromioclavicular joint effusion.      No fracture or dislocation is evident. Cystic changes are seen at the  greater tubercle of the humerus. Bone marrow signal intensity is  otherwise within normal limits.      SOFT TISSUES:      There is no significant volume of fluid in the subacromial subdeltoid  bursa.  There is no significant volume of fluid in the subcoracoid  bursa. Associated soft tissues of the shoulder are grossly  unremarkable.      IMPRESSION:  1. Partial backing out of the anchor from prior biceps tenodesis.  2. Mild supraspinatus infraspinatus tendinosis.  3. Mild degenerative change of the shoulder.      Signed by: Jordin Johnston 5/2/2024 9:46 AM  Dictation workstation:   [id number]     Assessment/Plan   Diagnoses and all orders for this visit:  Fibromyalgia  Neck pain, chronic  Impingement syndrome of left shoulder  Tear of right supraspinatus tendon  Cervical neuritis  -     topiramate (Topamax) 25 mg tablet; Take 9-10 tablets (225-250 mg) by mouth once daily. Take 4-5 in the morning and 4-5 in the evening. No more than 10 a day.  Discogenic syndrome, cervical  -     topiramate (Topamax) 25 mg tablet; Take 9-10 tablets (225-250 mg) by mouth once daily. Take 4-5 in the morning and 4-5 in the evening. No more than 10 a day.  Cervical spondylosis  Degeneration of cervical intervertebral disc  Arthritis of facet joint of cervical spine  -     topiramate (Topamax) 25 mg tablet; Take 9-10 tablets (225-250 mg) by mouth once daily. Take 4-5 in the morning and 4-5 in the evening. No more than 10 a day.       Patient is a 54-year-old female with the above-mentioned medical diagnoses following up after continuing on Topamax.  Reviewed the MRIs.  We discussed different options.  At this time, she still has not decided what she wants to do.  She states that she was supposed to see her spine surgeon and then she had to cancel the appointment because of a death in the family and  they will no longer see her.  She is going to look into other options.  We also discussed having her follow-up with an orthopedic surgeon there is some question of the screw backing out from her previous surgery.  Her surgeon has left the area.  At this time she is continue on Topamax.  Prescription sent to the pharmacy.  She is going to follow-up in 3 months for medication management.  She will let us know who if she decides to do wants to see in who she wants to be referred to.  We discussed injection options.  At this time, she thinks she just wants to see a surgeon and get things taken care of.

## 2024-11-12 ENCOUNTER — APPOINTMENT (OUTPATIENT)
Dept: PAIN MEDICINE | Facility: CLINIC | Age: 54
End: 2024-11-12
Payer: COMMERCIAL

## 2024-11-14 ENCOUNTER — OFFICE VISIT (OUTPATIENT)
Dept: PAIN MEDICINE | Facility: CLINIC | Age: 54
End: 2024-11-14
Payer: COMMERCIAL

## 2024-11-14 VITALS
WEIGHT: 189 LBS | DIASTOLIC BLOOD PRESSURE: 84 MMHG | SYSTOLIC BLOOD PRESSURE: 135 MMHG | HEART RATE: 89 BPM | RESPIRATION RATE: 16 BRPM | BODY MASS INDEX: 29.6 KG/M2

## 2024-11-14 DIAGNOSIS — M50.30 DISCOGENIC SYNDROME, CERVICAL: Primary | ICD-10-CM

## 2024-11-14 DIAGNOSIS — M50.30 DEGENERATION OF CERVICAL INTERVERTEBRAL DISC: ICD-10-CM

## 2024-11-14 DIAGNOSIS — M75.42 IMPINGEMENT SYNDROME OF LEFT SHOULDER: ICD-10-CM

## 2024-11-14 DIAGNOSIS — M79.7 FIBROMYALGIA: ICD-10-CM

## 2024-11-14 DIAGNOSIS — M47.812 ARTHRITIS OF FACET JOINT OF CERVICAL SPINE: ICD-10-CM

## 2024-11-14 DIAGNOSIS — M54.12 CERVICAL NEURITIS: ICD-10-CM

## 2024-11-14 PROCEDURE — 99214 OFFICE O/P EST MOD 30 MIN: CPT | Performed by: PHYSICIAN ASSISTANT

## 2024-11-14 RX ORDER — TOPIRAMATE 25 MG/1
225-250 TABLET ORAL DAILY
Qty: 900 TABLET | Refills: 1 | Status: SHIPPED | OUTPATIENT
Start: 2024-11-14 | End: 2025-05-13

## 2024-11-14 ASSESSMENT — ENCOUNTER SYMPTOMS
ALLERGIC/IMMUNOLOGIC NEGATIVE: 1
EYES NEGATIVE: 1
GASTROINTESTINAL NEGATIVE: 1
NUMBNESS: 1
PSYCHIATRIC NEGATIVE: 1
RESPIRATORY NEGATIVE: 1
MYALGIAS: 1
NECK STIFFNESS: 1
ENDOCRINE NEGATIVE: 1
ARTHRALGIAS: 1
WEAKNESS: 1
CARDIOVASCULAR NEGATIVE: 1
CONSTITUTIONAL NEGATIVE: 1
HEMATOLOGIC/LYMPHATIC NEGATIVE: 1
NECK PAIN: 1

## 2024-11-14 NOTE — PROGRESS NOTES
Subjective   Patient ID: Michelle Shi is a 54 y.o. female who presents for Fibromyalgia (FOLLOW UP FIBROMYALGIA, SHE FEELS THE TOPAMAX IS HELPING A LOT WITH HER PAIN AND HEADACHES, WHEN HE PAIN IS AT ITS WORST SHE WILL USES TYLENOL, LIDOCAINE PATCHES AND LAY DOWN, SHE REPOSITIONS OFTEN, SHE IS DUE FOR A REFILL). SHE NOTES AN INCREASE IN HEADACHES NOT SURE IT IS DUE TO THE WEATHER BUT TWO WEEKS AGO SHE HAD AN EPISODE LASTING 4 DAYS WHERE SHE FELT LIKE SHE WAS HIT IN THE BACK OF THE HEAD BRUISED FEELING AND IT SETTLED AT THE TOP OF THE LEFT SHOULDER, SHE IS STILL LOOKING FOR AN ORTHOPAEDIC SURGEON FOR HER NECK AND LEFT SHOULDER, PAIN SCORE 3/10, LAVON=52%    Chantell Terrazas, [facility] 11/14/24 9:11 AM     Patient is a 54-year-old female.  She presents today for a follow-up after continuing on the Topamax.  She feels that this has helped her fibromyalgia pain and her headaches.  Unfortunate, she still having a lot of neck pain with left greater than and right shoulder pain.  She also has numbness and tingling.    The left side goes down into her hand.  The right side goes down to her elbow.  She rates her discomfort a 3-5/10.  She takes Tylenol, lidocaine patches and Topamax.  This gives her some improvement.  She tolerates this well.   She has been doing her home exercise program that she was taught by the orthopedic surgeon as well as by the physical therapist. She has previously seen Dr. Espinoza who talked to her about having a fusion on her neck.  She had to miss an appointment due to a family issue and now she can no longer get an appointment with them.  She states that she has not found anybody else who is willing to see her.  She also states that she needs to be local.  She cannot drive to [place].  That is just too far for her.        Review of Systems   Constitutional: Negative.    HENT: Negative.     Eyes: Negative.    Respiratory: Negative.     Cardiovascular: Negative.    Gastrointestinal: Negative.    Endocrine:  Negative.    Genitourinary: Negative.    Musculoskeletal:  Positive for arthralgias, myalgias, neck pain and neck stiffness.   Skin: Negative.    Allergic/Immunologic: Negative.    Neurological:  Positive for weakness and numbness.   Hematological: Negative.    Psychiatric/Behavioral: Negative.         Objective   Physical Exam  Vitals and nursing note reviewed.   Constitutional:       General: She is not in acute distress.     Appearance: Normal appearance. She is not ill-appearing.   HENT:      Head: Normocephalic and atraumatic.      Right Ear: External ear normal.      Left Ear: External ear normal.      Nose: Nose normal.      Mouth/Throat:      Pharynx: Oropharynx is clear.   Eyes:      Conjunctiva/sclera: Conjunctivae normal.   Cardiovascular:      Rate and Rhythm: Normal rate and regular rhythm.      Pulses: Normal pulses.   Pulmonary:      Effort: Pulmonary effort is normal.      Breath sounds: Normal breath sounds.   Musculoskeletal:         General: Normal range of motion.      Cervical back: Normal range of motion.      Comments:  5/5 upper extremity strength other than left deltoid 4/5  Pain with internal and external rotation of left shoulder  Negative Maria Elena's       Skin:     General: Skin is warm and dry.   Neurological:      General: No focal deficit present.      Mental Status: She is alert and oriented to person, place, and time. Mental status is at baseline.   Psychiatric:         Mood and Affect: Mood normal.         Behavior: Behavior normal.         Thought Content: Thought content normal.         Judgment: Judgment normal.       MR cervical spine wo IV contrast  Status: Final result     PACS Images     Show images for MR cervical spine wo IV contrast  Signed by    Signed Time Phone Pager   Gali Licona DO 5/02/2024 07:34 [phone]     Exam Information    Status Exam Begun Exam Ended   Final 5/01/2024 17:36 5/01/2024 18:37     Study Result    Narrative & Impression    Interpreted By:  Gali Licona,   STUDY:  MR CERVICAL SPINE WO IV CONTRAST;  5/1/2024 6:37 pm      INDICATION:  Signs/Symptoms:NECK AND ARM PAIN, NUMBNESS, AND WEAKNESS.      COMPARISON:  02/21/2023      ACCESSION NUMBER(S):  [id number]      ORDERING CLINICIAN:  KERI ORNELAS      TECHNIQUE:  Sagittal T1, T2, STIR, axial T1 and axial T2 weighted images were  acquired through the cervical spine.      FINDINGS:  Alignment: There is again subtle, grade 1 anterolisthesis of C2 on C3  and C7 on T1.      Vertebrae/Intervertebral Discs: The vertebral bodies demonstrate  expected height.  There is an ovoid focus of increased signal on T1  and T2 weighted imaging within C7 posteriorly favored to represent a  hemangioma. Mild degenerative endplate signal changes are noted at  C5-6. There is multilevel disc desiccation and endplate spurring.      Cord: The cervical cord is somewhat degraded by artifact. No definite  intrinsic signal abnormality is identified within it.      The cerebellar tonsils again extend up to and slightly below the  level of the foramen magnum.      C1-C2: The cervicomedullary junction appears unremarkable. There is  no central canal stenosis. C2-C3: There is no posterior disc contour  abnormality. There is no significant central canal or neural  foraminal stenosis. C3-C4: There is again mild disc bulging without  central canal or neuroforaminal stenosis. There is mild uncovertebral  joint hypertrophy, left greater than right. The findings are similar  from the previous exam. C4-C5: There is again disc bulging without  significant central canal stenosis. Facet and uncovertebral joint  hypertrophy contribute to mild neuroforaminal narrowing. The findings  are similar from the previous exam. C5-C6: Posterior disc/osteophyte  complex does not significantly narrow the spinal canal. Facet and  uncovertebral joint hypertrophy contribute to severe left and  moderate right-sided neuroforaminal  narrowing, similar from the  previous exam. C6-C7: There is again a central disc protrusion  without significant central canal stenosis. The neuroforamina are  patent. C7-T1: There is no posterior disc contour abnormality. Small  perineural root sleeve cysts are again noted on the right. There is  no central canal or neuroforaminal stenosis.      The prevertebral and posterior paraspinous soft tissues are within  normal limits.      IMPRESSION:  Redemonstration of multilevel degenerative changes of the cervical  spine, overall very similar from 02/21/2023.      MACRO:  None      Signed by: Gali Licona 5/2/2024 7:34 AM  Dictation workstation:   [id number]   MR shoulder left wo IV contrast  Status: Final result     PACS Images     Show images for MR shoulder left wo IV contrast  Signed by    Signed Time Phone Pager   Jordin Johnston MD 5/02/2024 09:46 [phone]     Exam Information    Status Exam Begun Exam Ended   Final 5/01/2024 17:36 5/01/2024 18:21     Study Result    Narrative & Impression   Interpreted By:  Jordin Johnston,   STUDY:  MRI of the left shoulder  without intravenous contrast date 5/1/2024.      INDICATION:  Signs/Symptoms:LEFT SHOULDER PAIN      COMPARISON:  None.      ACCESSION NUMBER(S):  [id number]      ORDERING CLINICIAN:  KERI ORNELAS      TECHNIQUE:  Multiplanar multisequence MRI of the  left shoulder was performed  without intra-articular or intravenous contrast.      FINDINGS:  MUSCLES AND TENDONS:      There is mild increased intermediate signal intensity within the  distal to insertional supraspinatus and infraspinatus tendons. The  infraspinatus tendon is intact.  The teres minor tendon is intact.  The subscapularis tendon is intact.  There are findings compatible  with prior biceps tenotomy with subpectoral tenodesis. As can be seen  at image 24 of the axial 19 of the sagittal planes there is partial  backing out of the anchor. There also appears to be some  intermediate  signal intensity adjacent to the anchor. no significant rotator cuff  muscle atrophy is evident.  No mass is evident and the suprascapular  or spinoglenoid notch or the quadrangular space.      OSSEOUS STRUCTURES AND JOINTS:      No displaced glenoid labral tear is evident. There is  mild  degenerative change of the glenohumeral joint. There is  moderate  degenerative change of the acromioclavicular joint.   The acromion is  type II  without significant lateral downsloping.   There is a trace  volume glenohumeral and acromioclavicular joint effusion.      No fracture or dislocation is evident. Cystic changes are seen at the  greater tubercle of the humerus. Bone marrow signal intensity is  otherwise within normal limits.      SOFT TISSUES:      There is no significant volume of fluid in the subacromial subdeltoid  bursa.  There is no significant volume of fluid in the subcoracoid  bursa. Associated soft tissues of the shoulder are grossly  unremarkable.      IMPRESSION:  1. Partial backing out of the anchor from prior biceps tenodesis.  2. Mild supraspinatus infraspinatus tendinosis.  3. Mild degenerative change of the shoulder.      Signed by: Jordin Johnston 5/2/2024 9:46 AM  Dictation workstation:   [id number]   XR shoulder left 2+ views  Status: Final result     PACS Images     Show images for XR shoulder left 2+ views  Signed by    Signed Time Phone Pager   Inder Flaherty MD 4/22/2024 14:58 [phone]     Exam Information    Status Exam Begun Exam Ended   Final 4/22/2024 10:05 4/22/2024 10:17     Study Result    Narrative & Impression   Interpreted By:  Inder Flaherty,   STUDY:  XR SHOULDER LEFT 2+ VIEWS; ;  4/22/2024 10:17 am      INDICATION:  Signs/Symptoms:left shoulder pain.      COMPARISON:  None.      ACCESSION NUMBER(S):  [id number]      ORDERING CLINICIAN:  KERI ORNELAS      FINDINGS:  LEFT SHOULDER-AP, Y, INTERNAL OBLIQUE, GRASHEY AND AXILLARY VIEWS  The glenohumeral  joint is normal in width.  The articular margins are  intact.  No evidence of osteoarthritis is seen.  There is no soft  tissue calcification.      IMPRESSION:  No acute disease          MACRO:  None      Signed by: Inder Flaherty 4/22/2024 2:58 PM  Dictation workstation:   [id number]     Assessment/Plan   Diagnoses and all orders for this visit:  Discogenic syndrome, cervical  -     Referral to Spine Surgery; Future  -     topiramate (Topamax) 25 mg tablet; Take 9-10 tablets (225-250 mg) by mouth once daily. Take 4-5 in the morning and 4-5 in the evening. No more than 10 a day.  Degeneration of cervical intervertebral disc  -     Referral to Spine Surgery; Future  Cervical neuritis  -     Referral to Spine Surgery; Future  -     topiramate (Topamax) 25 mg tablet; Take 9-10 tablets (225-250 mg) by mouth once daily. Take 4-5 in the morning and 4-5 in the evening. No more than 10 a day.  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 Take 4-5 in the morning and 4-5 in the evening. No more than 10 a day.       Patient is a 54-year-old female with the above-mentioned medical diagnoses following up today Topamax.  She is requesting refills.  This to be sent to her pharmacy.  She also was supposed to see an orthopedic surgeon and a spine surgeon but she has not been seen either.  She states that she cannot find anybody locally to see and she cannot drive all the way to [place].  We once again reviewed her MRIs.  We discussed different options.  She was given the names of some local surgeons.  She is going to see Dr. Smith in [place] for his opinion on her neck and I referred her to [facility] in [place] for her shoulder.  At this time, we discussed a shoulder injection or cervical epidural steroid injection.  She states  that she did them long ago and they did not help.  She does not want to have them again.  She also states that her endocrinologist does not want her having any steroids.  She states that she has lost 50 pounds and they are working towards getting these things better under control and she feels that the steroids would affect this.  She does not want to do any injections.  She will call us if she changes her mind.  Otherwise she is going to continue on the Topamax and follow-up in 3 months.  Call the clinic in the interim should she require anything from our services.  OARRS reviewed.

## 2025-01-29 NOTE — PAT.ANE_ITS
Pre-Assessment    
Diagnosis/Proposed Procedure    
Planned Operative Procedure(s): Anterior Cervical Fusion C5-6    
Anesthesia History    
Anesthesia History - RN Documentation:     
                      Anesthesia History - RN Documentation    
    
    
    
Hx Hospitalization             No                           25 09:03    
     
Any Problems With Anesthesia   No                           25 09:03    
     
Cholinesterase deficiency      No                           25 09:03    
     
You/Your Family Experience     No                           25 09:03    
    
fever (hyperthermia) with           
     
Relationship                        
     
Recent Exposure to Contagious       
    
Disease                             
     
Does patient have nerve        No                           25 09:03    
    
stimulator                          
     
Patient instructed to have          
    
device shut off                     
     
--Does patient have Pacemaker       
    
or ICD?                             
     
When Was Last Pacemaker Check       
    
    
    
*** QUESTION #4 FULL TEXT: You/Your Family Experience fever (hyperthermia) with   
Anesthesia    
    
Last Oral Intake    
Last Oral intake:     
                                Last Oral Intake    
    
    
    
NPO since                           
     
Meds taken in AM with sips of       
    
water?                              
     
Meds patient instructed to          
    
take am of surgery                  
    
    
    
    
PONV    
PONV - RN Documentation:     
                             PONV - RN Documentation    
    
    
    
Female                         Yes                          25 09:03    
     
HX of Motion Sickness          No                           25 09:03    
     
HX of N/V After Surgery        No                           25 09:03    
     
Non-Smoker                     No                           25 09:03    
     
Duration of Surgery greater    Yes                          25 09:03    
    
than 60 minutes                     
     
Number of Risk Factors         2                            25 09:03    
     
PONV Score                     Moderate Risk                25 09:03    
    
    
    
    
Height & Weight    
Height & Weight:     
                           Anesthesia: Height & Weight    
    
    
    
Height                         5 ft 7 in                    24 12:35    
    
    
    
    
Respiratory Assessment    
Respiratory Assessment - RN Documentation:     
                Respiratory Tract Infection Hx - RN Documentation    
    
    
    
Hx Respiratory Tract Infection No                           25 09:03    
    
    
    
    
STOP Sleep Apnea    
STOP Sleep Apnea - RN Documentation:     
                       STOP Sleep Apnea - RN Documentation    
    
    
    
Hx Hypertension                No                           25 09:03    
     
Hx Sleep Apnea                 Yes: NON-COMPLIANT           25 09:03    
     
CPAP                           No                           25 09:03    
     
BIPAP                          No                           25 09:03    
     
Do you snore loudly (louder         
    
than talking or can be heard        
     
Do you often feel tired/            
    
fatigued/ sleepy during             
    
daytime?                            
     
Has anyone observed you stop        
    
breathing during sleep?             
     
STOP Results                   Positive                     25 09:03    
    
    
    
    
*** QUESTION #5 FULL TEXT : Do you snore loudly (louder than talking or can be   
heard through closed doors)?     
    
Tobacco Use History    
Tobacco Use History - RN Documentation:     
                     Tobacco Use History - RN Documentation    
    
    
    
Tobacco Use                         
     
Smoking Status                 Current every day smoker     25 09:03    
     
Hx Tobacco Use                 No                           25 09:03    
     
Years Smoking                       
     
Packs Smoked per Day                
     
Smoking Cessation Date was          
    
within the last 15 years            
     
Hx Smoking Cessation Date           
     
Hx Smoking Cessation                
    
Counseling                          
    
    
    
    
Hematologic Medial History    
Hematologic Hx - RN Documentation:     
                    Hematologic Medical Hx - RN documentation    
    
    
    
Hx of Blood Transfusion        No                           25 09:03    
     
Hx of Transfusion in last 3    No                           25 09:03    
    
Months                              
     
Date of Last Transfusion (if        
    
within last 3 months)               
     
Ever experience any problems   No                           25 09:03    
    
with transfusion(s)?                
     
Specify any problems                
     
Hx of Preganancy in last 3     No                           25 09:03    
    
Months                              
     
Nurse Filling Out Transfusion  VCHRISTIN                    25 09:03    
    
& Pregnancy Questions:              
     
Date:                          25 09:03    
     
Time:                          09:05                        25 09:03    
     
Patient unable to answer at         
    
this time (ie. confused,            
    
unrespo                             
    
    
    
    
Pregnancy/Reproduction History    
Pregnancy/Reproductive History - RN Documentation:     
                   Pregnancy/Reproductive Hx- RN Documentation    
    
    
    
Hx Pregnant Now                No                           25 09:03    
     
Gestational Age (in weeks):         
     
EDC:                                
     
Hx                           
     
Hx Para                             
     
Hx  Section                 
     
SAB                                 
     
Breastfeeding                  No                           25 09:03    
    
    
    
    
    
[facility]    
Medical History (Updated 25 @ 09:03 by Lina Aguirre)    
    
Wears partial dentures    
Wears glasses    
Hashimoto's disease    
Thyroid disease    
Arthritis    
Back pain    
Migraine headache    
Electronic cigarette use    
Sleep apnea    
History of stress test    
Menarche    
Stroke    
    
    
                                Home Medications    
    
    
    
?Medication ?Instructions ?Recorded ?Last Taken ?Type    
     
                          mecobalamin (vitamin B12) 1,000 1,000 mcg sublingual D    
AILY 24 Unknown     
History    
    
                                        mcg disintegrating        
    
                                        tablet,sublingual        
     
                          topiramate 25 mg tablet (Topamax) 25 mg PO BID     
4 Unknown History    
     
                          cholecalciferol (vitamin D3) 25 25 mcg PO DAILY  Unknown History    
    
                                        mcg (1,000 unit) capsule (Vitamin        
    
                                        D3)        
     
                          cyanocobalamin (vitamin B-12) 100 mcg IM .[id number]  Unknown History    
    
                                        1,000 mcg/mL injection solution        
    
                                        (Dodex)        
    
    
    
                                            
    
    
    
Allergy/AdvReac Type Severity Reaction Status Date / Time    
     
nortriptyline AdvReac  Blisters Verified 25 08:49    
     
tramadol AdvReac  hallucinati Verified 25 08:49    
    
   ons      
    
    
    
Family History (Updated 24 @ 12:44 by Mary Swartz)    
Other   Cancer    
   Cardiac disorder    
   Hypertension    
   Kidney disease    
   Thyroid disorder    
    
    
    
Surgical History (Updated 24 @ 12:45 by Mary Swartz)    
    
H/O elbow surgery    
H/O shoulder surgery    
H/O tubal ligation    
History of dilation and curettage    
History of carpal tunnel surgery    
    
    
Social History (Updated 24 @ 12:47 by Mary Swartz)    
household members:  spouse     
Smoking Status:  Current every day smoker tobacco type: e-cigarettes     
alcohol intake:  never     
    
    
    
Recommendation    
Anesthesia Recommendation    
Anesthesia recommendation: OPTIMIZED for anesthesia

## 2025-01-29 NOTE — PAT.ANESEVAL
Pre-Assessment
Diagnosis/Proposed Procedure
Planned Operative Procedure(s): Anterior Cervical Fusion C5-6
Anesthesia History
Anesthesia History - RN Documentation: 
Anesthesia History - RN Documentation

Hx Hospitalization             No                           25 09:03
Any Problems With Anesthesia   No                           25 09:03
Cholinesterase deficiency      No                           25 09:03
You/Your Family Experience     No                           25 09:03
fever (hyperthermia) with       
Relationship                    
Recent Exposure to Contagious   
Disease                         
Does patient have nerve        No                           25 09:03
stimulator                      
Patient instructed to have      
device shut off                 
--Does patient have Pacemaker   
or ICD?                         
When Was Last Pacemaker Check   


*** QUESTION #4 FULL TEXT: You/Your Family Experience fever (hyperthermia) with Anesthesia

Last Oral Intake
Last Oral intake: 
Last Oral Intake

NPO since                       
Meds taken in AM with sips of   
water?                          
Meds patient instructed to      
take am of surgery              



PONV
PONV - RN Documentation: 
PONV - RN Documentation

Female                         Yes                          25 09:03
HX of Motion Sickness          No                           25 09:03
HX of N/V After Surgery        No                           25 09:03
Non-Smoker                     No                           25 09:03
Duration of Surgery greater    Yes                          25 09:03
than 60 minutes                 
Number of Risk Factors         2                            25 09:03
PONV Score                     Moderate Risk                25 09:03



Height & Weight
Height & Weight: 
Anesthesia: Height & Weight

Height                         5 ft 7 in                    24 12:35



Respiratory Assessment
Respiratory Assessment - RN Documentation: 
Respiratory Tract Infection Hx - RN Documentation

Hx Respiratory Tract Infection No                           25 09:03



STOP Sleep Apnea
STOP Sleep Apnea - RN Documentation: 
STOP Sleep Apnea - RN Documentation

Hx Hypertension                No                           25 09:03
Hx Sleep Apnea                 Yes: NON-COMPLIANT           25 09:03
CPAP                           No                           25 09:03
BIPAP                          No                           25 09:03
Do you snore loudly (louder     
than talking or can be heard    
Do you often feel tired/        
fatigued/ sleepy during         
daytime?                        
Has anyone observed you stop    
breathing during sleep?         
STOP Results                   Positive                     25 09:03



*** QUESTION #5 FULL TEXT : Do you snore loudly (louder than talking or can be heard through closed doors)? 

Tobacco Use History
Tobacco Use History - RN Documentation: 
Tobacco Use History - RN Documentation

Tobacco Use                     
Smoking Status                 Current every day smoker     25 09:03
Hx Tobacco Use                 No                           25 09:03
Years Smoking                   
Packs Smoked per Day            
Smoking Cessation Date was      
within the last 15 years        
Hx Smoking Cessation Date       
Hx Smoking Cessation            
Counseling                      



Hematologic Medial History
Hematologic Hx - RN Documentation: 
Hematologic Medical Hx - RN documentation

Hx of Blood Transfusion        No                           25 09:03
Hx of Transfusion in last 3    No                           25 09:03
Months                          
Date of Last Transfusion (if    
within last 3 months)           
Ever experience any problems   No                           25 09:03
with transfusion(s)?            
Specify any problems            
Hx of Preganancy in last 3     No                           25 09:03
Months                          
Nurse Filling Out Transfusion  VCHRISTIN                    25 09:03
& Pregnancy Questions:          
Date:                          25 09:03
Time:                          09:05                        25 09:03
Patient unable to answer at     
this time (ie. confused,        
unrespo                         



Pregnancy/Reproduction History
Pregnancy/Reproductive History - RN Documentation: 
Pregnancy/Reproductive Hx- RN Documentation

Hx Pregnant Now                No                           25 09:03
Gestational Age (in weeks):     
EDC:                            
Hx                       
Hx Para                         
Hx  Section             
SAB                             
Breastfeeding                  No                           25 09:03




[facility]
Medical History (Updated 25 @ 09:03 by Lina Aguirre)

Wears partial dentures
Wears glasses
Hashimoto's disease
Thyroid disease
Arthritis
Back pain
Migraine headache
Electronic cigarette use
Sleep apnea
History of stress test
Menarche
Stroke


Home Medications

?Medication ?Instructions ?Recorded ?Last Taken ?Type
mecobalamin (vitamin B12) 1,000 1,000 mcg sublingual DAILY 24 Unknown History
mcg disintegrating    
tablet,sublingual    
topiramate 25 mg tablet (Topamax) 25 mg PO BID 24 Unknown History
cholecalciferol (vitamin D3) 25 25 mcg PO DAILY 25 Unknown History
mcg (1,000 unit) capsule (Vitamin    
D3)    
cyanocobalamin (vitamin B-12) 100 mcg IM .[id number] 25 Unknown History
1,000 mcg/mL injection solution    
(Dodex)    




Allergy/AdvReac Type Severity Reaction Status Date / Time
nortriptyline AdvReac  Blisters Verified 25 08:49
tramadol AdvReac  hallucinati Verified 25 08:49
   ons  


Family History (Updated 24 @ 12:44 by Mary Swartz)
Other
 Cancer
 Cardiac disorder
 Hypertension
 Kidney disease
 Thyroid disorder



Surgical History (Updated 24 @ 12:45 by Mary Swartz)

H/O elbow surgery
H/O shoulder surgery
H/O tubal ligation
History of dilation and curettage
History of carpal tunnel surgery


Social History (Updated 24 @ 12:47 by Mary Swartz)
household members:  spouse 
Smoking Status:  Current every day smoker tobacco type: e-cigarettes 
alcohol intake:  never 



Recommendation
Anesthesia Recommendation
Anesthesia recommendation: OPTIMIZED for anesthesia

## 2025-01-31 LAB
ANION GAP: 7 (ref 5–15)
BUN SERPL-MCNC: 14 MG/DL (ref 7–18)
BUN/CREAT RATIO: 16 RATIO (ref 10–20)
CALCIUM SERPL-MCNC: 9.4 MG/DL (ref 8.5–10.1)
CARBON DIOXIDE: 23 MMOL/L (ref 21–32)
CHLORIDE: 111 MMOL/L (ref 98–107)
DEPRECATED RDW RBC: 42.6 FL (ref 35.1–43.9)
ERYTHROCYTE [DISTWIDTH] IN BLOOD: 12.4 % (ref 11.6–14.6)
EST GLOM FILT RATE - AFR AMER: 87 ML/MIN (ref 60–?)
GLUCOSE: 92 MG/DL (ref 74–106)
HCT VFR BLD AUTO: 41.8 % (ref 37–47)
HEMOGLOBIN: 13.7 G/DL (ref 12–15)
HGB BLD-MCNC: 13.7 G/DL (ref 12–15)
IMMATURE GRANULOCYTES COUNT: 0.02 X10^3/UL (ref 0–0)
MAGNESIUM: 2.3 MG/DL (ref 1.6–2.6)
MCV RBC: 93.1 FL (ref 81–99)
MEAN CORP HGB CONC: 32.8 G/DL (ref 32–36)
MEAN PLATELET VOL.: 10.3 FL (ref 6.2–12)
NRBC FLAGGED BY ANALYZER: 0 % (ref 0–5)
PLATELET # BLD: 304 K/MM3 (ref 150–450)
PLATELET COUNT: 304 K/MM3 (ref 150–450)
POTASSIUM: 3.8 MMOL/L (ref 3.5–5.1)
RBC # BLD AUTO: 4.49 M/MM3 (ref 4.2–5.4)
RBC DISTRIBUTION WIDTH CV: 12.4 % (ref 11.6–14.6)
RBC DISTRIBUTION WIDTH SD: 42.6 FL (ref 35.1–43.9)
WBC # BLD AUTO: 3.8 K/MM3 (ref 4.4–11)
WHITE BLOOD COUNT: 3.8 K/MM3 (ref 4.4–11)

## 2025-01-31 NOTE — EKG12_ITS
Test Reason : PREOP  
Blood Pressure :   */*   mmHG  
Vent. Rate :  74 BPM     Atrial Rate :  74 BPM  
   P-R Int : 150 ms          QRS Dur :  92 ms  
    QT Int : 358 ms       P-R-T Axes :  59  24  52 degrees  
  QTcB Int : 397 ms  
   
Normal sinus rhythm  
Normal ECG  
   
Confirmed by ALIYAH WONG, PERRY ([id number]),  DYLLAN TAN ([id number]) on 2/3/2025 
6:08:19 AM  
   
Referred By: Jamey Heart           Confirmed By: PERRY LY MD

## 2025-02-03 ENCOUNTER — APPOINTMENT (OUTPATIENT)
Age: 55
End: 2025-02-03
Payer: COMMERCIAL

## 2025-02-03 VITALS
WEIGHT: 193.5 LBS | HEART RATE: 90 BPM | SYSTOLIC BLOOD PRESSURE: 138 MMHG | DIASTOLIC BLOOD PRESSURE: 88 MMHG | BODY MASS INDEX: 30.37 KG/M2 | OXYGEN SATURATION: 100 % | HEIGHT: 67 IN

## 2025-02-03 DIAGNOSIS — Z12.31 ENCOUNTER FOR SCREENING MAMMOGRAM FOR MALIGNANT NEOPLASM OF BREAST: Primary | ICD-10-CM

## 2025-02-03 DIAGNOSIS — G47.33 OBSTRUCTIVE SLEEP APNEA ON CPAP: ICD-10-CM

## 2025-02-03 DIAGNOSIS — Z01.818 PREOPERATIVE CLEARANCE: ICD-10-CM

## 2025-02-03 PROCEDURE — 1036F TOBACCO NON-USER: CPT | Performed by: INTERNAL MEDICINE

## 2025-02-03 PROCEDURE — 99214 OFFICE O/P EST MOD 30 MIN: CPT | Performed by: INTERNAL MEDICINE

## 2025-02-03 PROCEDURE — 3008F BODY MASS INDEX DOCD: CPT | Performed by: INTERNAL MEDICINE

## 2025-02-03 ASSESSMENT — ENCOUNTER SYMPTOMS
COUGH: 0
PALPITATIONS: 0
WHEEZING: 0
CHEST TIGHTNESS: 0
VOMITING: 0
SHORTNESS OF BREATH: 0
FATIGUE: 0
NAUSEA: 0
ARTHRALGIAS: 0
BLOOD IN STOOL: 0
DIARRHEA: 0
ABDOMINAL PAIN: 0

## 2025-02-03 NOTE — ASSESSMENT & PLAN NOTE
-She does not tolerate CPAP therapy but her surgical team is aware of her sleep apnea and will monitor carefully postoperatively

## 2025-02-03 NOTE — PROGRESS NOTES
Subjective   Patient ID: Michelle Shi is a 54 y.o. female who presents for Pre-op Exam (PRE OP CLEAR SPINAL FUSION DR FARFAN).  HPI  She is here today for preoperative assessment in anticipation of an ACDF at the C5-C6 level on February 12 with Dr. Smith.  Unfortunately she has had significant symptoms from her cervical spine disease including pain in her arm and chronic headaches.  She has already completed her preoperative she has already completed her preoperative testing which includes a recent normal EKG and also normal comprehensive lab work including a swab for MRSA of the nasal passages.  Everything came back negative.  She has no history of heart disease and she managed to climb up and down her stairs in her home on average 12-15 times a day.  At no point did she experience any breathing difficulties or chest pain.  She also had what appeared to be a cryptogenic stroke when she was 40 years old.  She had an exhaustive workup including several specialist at the [facility] and nothing of a serious nature was identified.  She has had no other events in all these years leading up to today.  She does suffer from sleep apnea and has been unable to tolerate a CPAP mask due to nasal issues.  She states that her surgical team is aware and they will be monitoring closely especially postoperatively.  At this point I see no contraindications to proceeding with surgery as planned.  We discussed the fact that her risk was low but she understands that the risk is never 0 and it is possible that she could develop a complication which might include a stroke, heart attack, blood clot, infection, or bleeding complications.  I will send a copy of today's progress note along with the clearance form to her surgeon  She has been instructed to avoid NSAIDs and fish oil derivatives until after completion of surgery.  Review of Systems   Constitutional:  Negative for fatigue.   Respiratory:  Negative for cough, chest  tightness, shortness of breath and wheezing.    Cardiovascular:  Negative for chest pain, palpitations and leg swelling.   Gastrointestinal:  Negative for abdominal pain, blood in stool, diarrhea, nausea and vomiting.   Musculoskeletal:  Negative for arthralgias.     Objective   Physical Exam  Vitals and nursing note reviewed.   Constitutional:       General: She is not in acute distress.     Appearance: Normal appearance.   HENT:      Head: Normocephalic and atraumatic.   Eyes:      Conjunctiva/sclera: Conjunctivae normal.   Cardiovascular:      Rate and Rhythm: Normal rate and regular rhythm.      Heart sounds: Normal heart sounds.   Pulmonary:      Effort: No respiratory distress.      Breath sounds: No wheezing.   Abdominal:      Palpations: Abdomen is soft.      Tenderness: There is no abdominal tenderness. There is no guarding.   Musculoskeletal:         General: No swelling. Normal range of motion.   Skin:     General: Skin is warm and dry.   Neurological:      General: No focal deficit present.      Mental Status: She is alert and oriented to person, place, and time.   Psychiatric:         Behavior: Behavior normal.       Assessment/Plan   Problem List Items Addressed This Visit             ICD-10-CM    Obstructive sleep apnea on CPAP G47.33     -She does not tolerate CPAP therapy but her surgical team is aware of her sleep apnea and will monitor carefully postoperatively         Encounter for screening mammogram for malignant neoplasm of breast - Primary Z12.31     -She is overdue for mammogram so we have placed the order and encouraged her to get that done as soon as possible-         Relevant Orders    BI mammo bilateral screening tomosynthesis    Preoperative clearance Z01.818     -No history of heart disease  -Is very active and climbs stairs 12-15 times a day in her home without any cardiopulmonary symptoms  -Had a cryptogenic stroke back at age 40 and had an exhaustive workup which led to no  appreciable cause.  The good news is she has had no events since that time  -We discussed avoiding NSAIDs and fish oil derivatives until after surgery  -She will contact her surgeon as she was instructed to drink a special kind of Ensure a few days prior to her procedure as well as cleanse with Hibiclens the night before her surgery.  She will check on the specifics with her surgeon  -She knows that I am clearing her today for her procedure        Patient instructions  As we discussed I am clearing you for surgery and I will send this note along with a form letting your surgeon know that you have been cleared.  Please remember our conversation about avoiding over-the-counter NSAIDs or fish oil 10 days prior to your procedure because these products have been known to increase your risk of bleeding.  Please clarify with your surgical team what your surgeon is wanting you to do in the way of drinking Ensure supplement and also the use of Hibiclens which is a surgical soap  Please contact us with any questions or concerns whatsoever  Please remember to complete your FOBT kit at your earliest convenience and drop it off at this office  Please also schedule your mammogram even if it has to be done a couple of months from today         Kiarra Smith, DO

## 2025-02-03 NOTE — PATIENT INSTRUCTIONS
Patient instructions  As we discussed I am clearing you for surgery and I will send this note along with a form letting your surgeon know that you have been cleared.  Please remember our conversation about avoiding over-the-counter NSAIDs or fish oil 10 days prior to your procedure because these products have been known to increase your risk of bleeding.  Please clarify with your surgical team what your surgeon is wanting you to do in the way of drinking Ensure supplement and also the use of Hibiclens which is a surgical soap  Please contact us with any questions or concerns whatsoever  Please remember to complete your FOBT kit at your earliest convenience and drop it off at this office  Please also schedule your mammogram even if it has to be done a couple of months from today

## 2025-02-03 NOTE — ASSESSMENT & PLAN NOTE
-No history of heart disease  -Is very active and climbs stairs 12-15 times a day in her home without any cardiopulmonary symptoms  -Had a cryptogenic stroke back at age 40 and had an exhaustive workup which led to no appreciable cause.  The good news is she has had no events since that time  -We discussed avoiding NSAIDs and fish oil derivatives until after surgery  -She will contact her surgeon as she was instructed to drink a special kind of Ensure a few days prior to her procedure as well as cleanse with Hibiclens the night before her surgery.  She will check on the specifics with her surgeon  -She knows that I am clearing her today for her procedure

## 2025-02-03 NOTE — ASSESSMENT & PLAN NOTE
-She is overdue for mammogram so we have placed the order and encouraged her to get that done as soon as possible-

## 2025-02-06 ENCOUNTER — APPOINTMENT (OUTPATIENT)
Dept: PAIN MEDICINE | Facility: CLINIC | Age: 55
End: 2025-02-06
Payer: COMMERCIAL

## 2025-02-12 ENCOUNTER — HOSPITAL ENCOUNTER (OUTPATIENT)
Dept: HOSPITAL 100 - SDC | Age: 55
Discharge: HOME | End: 2025-02-12
Payer: COMMERCIAL

## 2025-02-12 VITALS
HEART RATE: 90 BPM | DIASTOLIC BLOOD PRESSURE: 77 MMHG | OXYGEN SATURATION: 98 % | RESPIRATION RATE: 16 BRPM | SYSTOLIC BLOOD PRESSURE: 121 MMHG

## 2025-02-12 VITALS
RESPIRATION RATE: 16 BRPM | SYSTOLIC BLOOD PRESSURE: 121 MMHG | DIASTOLIC BLOOD PRESSURE: 92 MMHG | OXYGEN SATURATION: 98 % | HEART RATE: 91 BPM

## 2025-02-12 VITALS
OXYGEN SATURATION: 96 % | HEART RATE: 93 BPM | SYSTOLIC BLOOD PRESSURE: 121 MMHG | DIASTOLIC BLOOD PRESSURE: 77 MMHG | RESPIRATION RATE: 16 BRPM

## 2025-02-12 VITALS
DIASTOLIC BLOOD PRESSURE: 77 MMHG | RESPIRATION RATE: 16 BRPM | HEART RATE: 81 BPM | OXYGEN SATURATION: 100 % | SYSTOLIC BLOOD PRESSURE: 121 MMHG | TEMPERATURE: 97.1 F

## 2025-02-12 VITALS
OXYGEN SATURATION: 98 % | DIASTOLIC BLOOD PRESSURE: 77 MMHG | HEART RATE: 92 BPM | RESPIRATION RATE: 16 BRPM | SYSTOLIC BLOOD PRESSURE: 126 MMHG

## 2025-02-12 VITALS
RESPIRATION RATE: 18 BRPM | SYSTOLIC BLOOD PRESSURE: 141 MMHG | DIASTOLIC BLOOD PRESSURE: 77 MMHG | OXYGEN SATURATION: 99 % | HEART RATE: 78 BPM

## 2025-02-12 VITALS
DIASTOLIC BLOOD PRESSURE: 91 MMHG | OXYGEN SATURATION: 98 % | SYSTOLIC BLOOD PRESSURE: 146 MMHG | TEMPERATURE: 97.88 F | HEART RATE: 82 BPM | RESPIRATION RATE: 18 BRPM

## 2025-02-12 VITALS
HEART RATE: 81 BPM | DIASTOLIC BLOOD PRESSURE: 77 MMHG | SYSTOLIC BLOOD PRESSURE: 121 MMHG | OXYGEN SATURATION: 100 % | TEMPERATURE: 97.1 F | RESPIRATION RATE: 16 BRPM

## 2025-02-12 VITALS
OXYGEN SATURATION: 100 % | SYSTOLIC BLOOD PRESSURE: 149 MMHG | TEMPERATURE: 97.5 F | DIASTOLIC BLOOD PRESSURE: 77 MMHG | RESPIRATION RATE: 16 BRPM | HEART RATE: 85 BPM

## 2025-02-12 VITALS
TEMPERATURE: 97.34 F | SYSTOLIC BLOOD PRESSURE: 124 MMHG | HEART RATE: 83 BPM | OXYGEN SATURATION: 96 % | DIASTOLIC BLOOD PRESSURE: 69 MMHG | RESPIRATION RATE: 2 BRPM

## 2025-02-12 VITALS
RESPIRATION RATE: 16 BRPM | DIASTOLIC BLOOD PRESSURE: 77 MMHG | OXYGEN SATURATION: 88 % | SYSTOLIC BLOOD PRESSURE: 121 MMHG | HEART RATE: 87 BPM | TEMPERATURE: 97.3 F

## 2025-02-12 VITALS — BODY MASS INDEX: 29.3 KG/M2

## 2025-02-12 DIAGNOSIS — M50.122: Primary | ICD-10-CM

## 2025-02-12 DIAGNOSIS — M48.02: ICD-10-CM

## 2025-02-12 DIAGNOSIS — F17.290: ICD-10-CM

## 2025-02-12 DIAGNOSIS — E05.00: ICD-10-CM

## 2025-02-12 DIAGNOSIS — E06.3: ICD-10-CM

## 2025-02-12 PROCEDURE — 86708 HEPATITIS A ANTIBODY: CPT

## 2025-02-12 PROCEDURE — 83735 ASSAY OF MAGNESIUM: CPT

## 2025-02-12 PROCEDURE — 84443 ASSAY THYROID STIM HORMONE: CPT

## 2025-02-12 PROCEDURE — 00600 ANES PX CRV SPINE&CORD NOS: CPT

## 2025-02-12 PROCEDURE — 85025 COMPLETE CBC W/AUTO DIFF WBC: CPT

## 2025-02-12 PROCEDURE — 22845 INSERT SPINE FIXATION DEVICE: CPT

## 2025-02-12 PROCEDURE — 87081 CULTURE SCREEN ONLY: CPT

## 2025-02-12 PROCEDURE — 86901 BLOOD TYPING SEROLOGIC RH(D): CPT

## 2025-02-12 PROCEDURE — 86900 BLOOD TYPING SEROLOGIC ABO: CPT

## 2025-02-12 PROCEDURE — 82962 GLUCOSE BLOOD TEST: CPT

## 2025-02-12 PROCEDURE — 93005 ELECTROCARDIOGRAM TRACING: CPT

## 2025-02-12 PROCEDURE — 86803 HEPATITIS C AB TEST: CPT

## 2025-02-12 PROCEDURE — 72040 X-RAY EXAM NECK SPINE 2-3 VW: CPT

## 2025-02-12 PROCEDURE — 76000 FLUOROSCOPY <1 HR PHYS/QHP: CPT

## 2025-02-12 PROCEDURE — 36415 COLL VENOUS BLD VENIPUNCTURE: CPT

## 2025-02-12 PROCEDURE — 20931 SP BONE ALGRFT STRUCT ADD-ON: CPT

## 2025-02-12 PROCEDURE — 22551 ARTHRD ANT NTRBDY CERVICAL: CPT

## 2025-02-12 PROCEDURE — C1713 ANCHOR/SCREW BN/BN,TIS/BN: HCPCS

## 2025-02-12 PROCEDURE — 86850 RBC ANTIBODY SCREEN: CPT

## 2025-02-12 PROCEDURE — 86703 HIV-1/HIV-2 1 RESULT ANTBDY: CPT

## 2025-02-12 PROCEDURE — 86706 HEP B SURFACE ANTIBODY: CPT

## 2025-02-12 PROCEDURE — 80048 BASIC METABOLIC PNL TOTAL CA: CPT

## 2025-02-12 RX ADMIN — CEFAZOLIN 150 GM: 10 INJECTION, POWDER, FOR SOLUTION INTRAVENOUS at 14:03

## 2025-02-12 RX ADMIN — SODIUM CHLORIDE 15 ML: 9 INJECTION, SOLUTION INTRAVENOUS at 12:11

## 2025-02-12 RX ADMIN — TRANEXAMIC ACID 440 MG: 100 INJECTION, SOLUTION INTRAVENOUS at 13:47

## 2025-02-12 RX ADMIN — HYDROCODONE BITARTRATE AND ACETAMINOPHEN 1 TABLET: 5; 325 TABLET ORAL at 17:10

## 2025-02-12 RX ADMIN — MAGNESIUM SULFATE HEPTAHYDRATE 408 GM: 500 INJECTION, SOLUTION INTRAMUSCULAR; INTRAVENOUS at 12:00

## 2025-02-12 RX ADMIN — TRANEXAMIC ACID 440 MG: 100 INJECTION, SOLUTION INTRAVENOUS at 15:07

## 2025-02-12 NOTE — PCM.OPRPT
Procedures Musculoskeletal
[id number]: Other Procedure See Report

Operative Report (Standard)
Operative Information
Date of Procedure: 02/12/25
Pre-Operative Diagnosis: C5-6 disc degeneration with severe foraminal stenosis, radiculopathy
Post-Operative Diagnosis: Same
Surgery/Procedure Performed: C5-6 ACDF
FIRST Assistant: Yes First Assistant: Arabella Patricio  
Tasks completed by first assist: Closing, Removing tissue, Hemostasis: Electrocautery and Retracting
Type of Anesthesia: General
RN Documented Start/Stop Times: 



Operation Date: 02/12/25 13:30
Case Time  
Into Pre-Op 02/12/25 11:11
Anesthesia Start 02/12/25 13:43
Into Room 02/12/25 13:43
Procedure Start 02/12/25 14:13



Procedure Start Time: 14:13
Procedure Stop Time: 15:35
Select all DRAINS/GRAFTS/IMPLANTS that apply: Graft Graft details: Allograft cortical cancellous strut, DBX  and 
Implanted device Implanted device details: Medtronic Atlantis Elite plate instrumentation
Estimated Blood Loss: 20 cc
Specimen collected: No
Description of surgery: 
Preoperative diagnosis: C5-6 disc degeneration with stenosis, radiculopathy

Postoperative diagnosis: Same

Name of procedure: C5-6 anterior cervical discectomy and fusion with plate instrumentation
- Anterior cervical fusion C5-6, CPT code 52681
- Anterior plate instrumentation C5-6, CPT code 99742/59
-C5-6 structural allograft bone with DBX, CPT code 89510

Attending surgeon:  Jamey Heart M.D.

Anesthesia: Gen. endotracheal

Estimated blood loss: 20 mL

Complications: None

Instrumentation used: Medtronic Atlantis Elite plate, LASR corticocancellous 
block

Indications: The patient is a pleasant 54-year-old lady who presented with neck pain, left worse than right upper extremity radiation of pain and numbness.  MRI showed C5-6 disc degeneration with severe left worse than right foraminal stenosis.  
After prolonged course of nonsurgical treatment, the patient requested surgical treatment. All risks and benefits of the procedure were explained to the patient. The risks include but are not limited to infection, bleeding, injury to nerves and 
vessels, vertebral artery injury, spinal cord injury, paralysis, vocal cord paralysis, injury to esophagus, pseudoarthrosis, need for further procedures, adjacent segment degeneration. 

Procedure: The patient was identified in the preoperative suite using unique 
patient identifiers. Skin was marked consent was taken and all questions were 
answered. The patient was then brought back to the operative room and a timeout was performed. General endotracheal anesthesia was given. Intraoperative neuro monitoring leads were applied. The patient was carefully positioned supine on a regular OR 
table. A lateral view with a C-arm was done to identify the level and to define the incision. The anterior neck was then prepped and draped in the usual fashion. A final timeout was then performed.  A transverse skin incision was taken to the left 
of midline. Subcutaneous tissue was then divided with Bovie. Platysma was identified and cut along the incision with scissors. The fascial interval between the sternocleidomastoid and the larynx was developed. Omohyoid was identified and retracted.  
The esophagus with the larynx was retracted medially to reach the prevertebral fascia.  Marker x-ray was performed with bent spinal needle and disc space and levels were confirmed.  Longus coli muscle was elevated on both sides at and above and 
below C5-6 disc. 
Self-retaining retractors were then placed.  A long handle knife was then used to perform annulotomy at C5-6. Disc fragments were removed with the pituitary.  [place] pins were placed in C5 and C6 for disc distraction.  Curettes and bur was utilized 
to remove cartilage from the endplates. Discectomy was performed laterally up to the uncovertebral joints.  Posterior osteophytes were thinned down with the bur and adequate decompression in the central and foraminal areas were performed and PLL was 
thinned out.
Once the disc space was prepared, trials of various sizes were utilized. Thorough irrigation was given.  6 mm LASR cortical cancellous allograft bone large footprint was then fashioned in such a way that concavities were burred out inferiorly and 
superiorly and half cc of DBX (demineralized bone matrix) was squeezed into the cancellous portion. The graft was then inserted into the C5-6 disc space.   The graft was found to be in good apposition with good pullout strength. A 23 mm  Medtronic 
Atlantis Elite plate was then fixed to C5-6 with 15 mm screws. A lateral x-ray was then taken to check the length of the screws.  Both AP and lateral x-rays showed good positioning of plate and screws.  The locking mechanism over the screw heads was 
then turned. Thorough irrigation was again 
given. Hemostasis was achieved with bipolar and Floseal.  Closure was done with 3-0 Vicryl for the platysma and subcutaneous tissue layers and 4-0 Monocryl for the skin. Steri-Strips were applied and dressing was done with 4 x 4 gauze and Tegaderm. 
A cervical collar was then applied. The patient was then woken up from anesthesia extubated and taken to PACU in stable condition. 

Intraoperative neuro monitoring was performed throughout this procedure. Motor evoked potentials were run periodically.  All potentials remained at baseline throughout the procedure.  I was present for the entire surgery and performed the surgery 
myself.

Assistant Arabella Patricio PA-C. My physician assistant was a vital part of this case.  They were important in appropriate retraction during the case, and protection of soft tissues during the procedure. Their intimate knowledge of the case and my 
steps aided in safe and expedient completion of the procedure as well as appropriate position of the patient during the surgery.  They were also vital in assisting with closure under my direct supervision.
Surgical Findings: 
See operative note
Complications
Complications: No

## 2025-02-12 NOTE — PRE.ANES_ITS
ASA Classification*    
ASA Classification    
ASA Classification: 2    
    
Assessment & Plan Anesthesia*    
Anesthesia Assessment    
Anesthesia Assessment:     
    
Discussed sedation and/or anesthesia options, risks, benefits, and alternatives   
with patient/parents/legal guardian/POA. Questions invited.     
    
The patient/parents/legal guardian/POA seems to understand and agrees to proceed  
with anesthesia plan.    
    
Reviewed the physical assessment, medical history, allergy history and patient   
home medications list prior to surgery/procedure/anesthetic and documented any   
changes.    
    
Performed airway and anesthesia risk assessments.    
    
Anesthesia Type    
Anesthesia Type: General    
    
Anesthesia Focused Assessment*    
Temperature: 97.1 F    
Pulse Rate: 81    
Blood Pressure: 121/77    
Respiratory Rate: 16    
Pulse Ox: 100    
Airway Assessment    
Mouth opens: >3 cm    
Mallampati Score: II    
    
Focused Labs    
Anesthesia Preop lab:     
    
    
                                                    *** CBC ***    
     
                WBC             3.8 K/mm3 (4.4-11.0)  L 25 09:25    
     
                RBC             4.49 M/mm3 (4.2-5.4)  25 09:25    
     
                Hgb             13.7 g/dL (12.0-15.0)  25 09:25    
     
                Hct             41.8 % (37-47)  25 09:25    
     
                Plt Count       304 K/mm3 (150-450)  25 09:25    
     
                                                    *** CHEMISTRY ***    
     
                Potassium       3.8 mmol/L (3.5-5.1)  25 09:25    
     
                Sodium          141 mmol/L (136-145)  25 09:25    
     
                Magnesium       2.3 mg/dL (1.6-2.6)  25 09:25    
     
                BUN             14 mg/dL (7-18)  25 09:25    
     
                Creatinine      0.88 mg/dL (0.55-1.02)  25 09:25    
     
                Glucose         92 mg/dL ()  25 09:25    
     
                TSH             1.300 uIU/mL (0.358-3.740)  25 09:28  01/3    
1/25    
     
                                                    *** COAG ***    
     
                                                    *** PREGNANCY ***    
    
    
    
Pre-Assessment    
Diagnosis/Proposed Procedure    
Planned Operative Procedure(s): Anterior Cervical Fusion C5-6    
Anesthesia History    
Anesthesia History - RN Documentation:     
                      Anesthesia History - RN Documentation    
    
    
    
Hx Hospitalization             No                           25 09:03    
     
Any Problems With Anesthesia   No                           25 09:03    
     
Cholinesterase deficiency      No                           25 09:03    
     
You/Your Family Experience     No                           25 09:03    
    
fever (hyperthermia) with           
     
Relationship                        
     
Recent Exposure to Contagious  No                           25 12:02    
    
Disease                             
     
Does patient have nerve        No                           25 09:03    
    
stimulator                          
     
Patient instructed to have          
    
device shut off                     
     
--Does patient have Pacemaker  No                           25 12:02    
    
or ICD?                             
     
When Was Last Pacemaker Check       
    
    
    
*** QUESTION #4 FULL TEXT: You/Your Family Experience fever (hyperthermia) with   
Anesthesia    
    
Last Oral Intake    
Last Oral intake:     
                                Last Oral Intake    
    
    
    
NPO since                      08:00                        25 12:02    
     
Meds taken in AM with sips of  No                           25 12:02    
    
water?                              
     
Meds patient instructed to          
    
take am of surgery                  
    
    
    
    
PONV    
PONV - RN Documentation:     
                             PONV - RN Documentation    
    
    
    
Female                         Yes                          25 09:03    
     
HX of Motion Sickness          No                           25 09:03    
     
HX of N/V After Surgery        No                           25 09:03    
     
Non-Smoker                     No                           25 09:03    
     
Duration of Surgery greater    Yes                          25 09:03    
    
than 60 minutes                     
     
Number of Risk Factors         2                            25 09:03    
     
PONV Score                     Moderate Risk                25 09:03    
    
    
    
    
Height & Weight    
Height & Weight:     
                           Anesthesia: Height & Weight    
    
    
    
Height                         5 ft 7 in                    25 12:02    
     
Weight:                        85 kg                        25 12:02    
     
Body Mass Index (BMI)          29.3                         25 12:02    
    
    
    
    
Respiratory Assessment    
Respiratory Assessment - RN Documentation:     
                Respiratory Tract Infection Hx - RN Documentation    
    
    
    
Hx Respiratory Tract Infection No                           25 09:03    
    
    
    
    
STOP Sleep Apnea    
STOP Sleep Apnea - RN Documentation:     
                       STOP Sleep Apnea - RN Documentation    
    
    
    
Hx Hypertension                No                           25 09:03    
     
Hx Sleep Apnea                 Yes: NON-COMPLIANT           25 09:03    
     
CPAP                           No                           25 09:03    
     
BIPAP                          No                           25 09:03    
     
Do you snore loudly (louder         
    
than talking or can be heard        
     
Do you often feel tired/            
    
fatigued/ sleepy during             
    
daytime?                            
     
Has anyone observed you stop        
    
breathing during sleep?             
     
STOP Results                   Positive                     25 09:03    
    
    
    
    
*** QUESTION #5 FULL TEXT : Do you snore loudly (louder than talking or can be   
heard through closed doors)?     
    
Tobacco Use History    
Tobacco Use History - RN Documentation:     
                     Tobacco Use History - RN Documentation    
    
    
    
Tobacco Use                         
     
Smoking Status                 Current every day smoker     25 09:03    
     
Hx Tobacco Use                 No                           25 09:03    
     
Years Smoking                       
     
Packs Smoked per Day                
     
Smoking Cessation Date was          
    
within the last 15 years            
     
Hx Smoking Cessation Date           
     
Hx Smoking Cessation                
    
Counseling                          
    
    
    
    
Hematologic Medial History    
Hematologic Hx - RN Documentation:     
                    Hematologic Medical Hx - RN documentation    
    
    
    
Hx of Blood Transfusion        No                           25 09:03    
     
Hx of Transfusion in last 3    No                           25 09:03    
    
Months                              
     
Date of Last Transfusion (if        
    
within last 3 months)               
     
Ever experience any problems   No                           25 09:03    
    
with transfusion(s)?                
     
Specify any problems                
     
Hx of Preganancy in last 3     No                           25 09:03    
    
Months                              
     
Nurse Filling Out Transfusion  VCHRISTIN                    25 09:03    
    
& Pregnancy Questions:              
     
Date:                          25 09:03    
     
Time:                          09:05                        25 09:03    
     
Patient unable to answer at         
    
this time (ie. confused,            
    
unrespo                             
    
    
    
    
Pregnancy/Reproduction History    
Pregnancy/Reproductive History - RN Documentation:     
                   Pregnancy/Reproductive Hx- RN Documentation    
    
    
    
Hx Pregnant Now                No                           25 09:03    
     
Gestational Age (in weeks):         
     
EDC:                                
     
Hx                           
     
Hx Para                             
     
Hx  Section                 
     
SAB                                 
     
Breastfeeding                  No                           25 09:03    
    
    
    
    
Active Medications    
Active Medications:     
Current Medications    
    
    
    
Generic Name Dose Route Start Last Admin    
    
  Trade Name Freq  PRN Reason Stop Dose Admin    
     
Acetaminophen  1,000 mg  25 13:30  25 12:12    
    
  Acetaminophen 500 Mg Tablet  PO  25 13:31  1,000 mg    
    
  X1 ONE   Administration    
     
Dexamethasone Sodium Phosphate  8 mg  25 13:30     
    
  Dexamethasone 10 Mg/Ml Vial  IV  25 13:31     
    
  X1 ONE      
     
Dexamethasone Sodium Phosphate  4 mg  25 13:30     
    
  Dexamethasone 4 Mg/Ml Vial  IV  25 13:31     
    
  X1 ONE      
     
Cefazolin Sodium 2 gm/ Sodium  110 mls @ 150 mls/hr  25 13:30     
    
  Chloride  IV  25 14:13     
    
  PREOP ONE      
     
Tranexamic Acid 1,000 mg/  110 mls @ 440 mls/hr  25 13:30     
    
  Sodium Chloride  IV  25 13:44     
    
  X1 ONE      
     
Tranexamic Acid 1,000 mg/  110 mls @ 440 mls/hr  25 13:30     
    
  Sodium Chloride  IV  25 13:44     
    
  X1 ONE      
     
Magnesium Sulfate 1 gm/  102 mls @ 408 mls/hr  25 13:30  25 12:00    
    
  Dextrose  IV  25 13:44  408 mls/hr    
    
  X1 ONE   Administration    
     
Sodium Chloride  1,000 mls @ 15 mls/hr  25 11:15  25 12:11    
    
  IV  25 00:34  15 mls/hr    
    
  .Q48H LEXIE   Administration    
    
  Protocol      
     
Insulin Human Lispro  1 - 6 unit  25 13:30     
    
  Insulin Lispro 100 Unit/Ml Insuln.Pen  SC  25 23:59     
    
  Q4H PRN PRN      
    
  BG>/= 180, SEE PROTOCOL      
    
  Protocol      
    
    
    
    
PFSH    
Medical History (Reviewed 25 @ 12:35 by Dr. Sonu Gay MD)    
    
Wears partial dentures    
Wears glasses    
Hashimoto's disease    
Thyroid disease    
Arthritis    
Back pain    
Migraine headache    
Electronic cigarette use    
Sleep apnea    
History of stress test    
Menarche    
Stroke    
    
    
                                Home Medications    
    
    
    
?Medication ?Instructions ?Recorded ?Last Taken ?Type    
     
                          mecobalamin (vitamin B12) 1,000 1,000 mcg sublingual D    
AILY 24 Unknown     
History    
    
                                        mcg disintegrating        
    
                                        tablet,sublingual        
     
                          topiramate 25 mg tablet (Topamax) 25 mg PO BID     
4 Unknown History    
     
                          cholecalciferol (vitamin D3) 25 25 mcg PO DAILY  Unknown History    
    
                                        mcg (1,000 unit) capsule (Vitamin        
    
                                        D3)        
     
                          cyanocobalamin (vitamin B-12) 100 mcg IM .[id number]  Unknown History    
    
                                        1,000 mcg/mL injection solution        
    
                                        (Dodex)        
     
                          hydrocodone-acetaminophen 5-325mg 1 tab PO DAILY PRN p    
ain 25 Unknown     
History    
    
                                        5mg-325mg        
    
    
    
                                            
    
    
    
Allergy/AdvReac Type Severity Reaction Status Date / Time    
     
oxycodone Allergy  hives Verified 25 12:00    
     
nortriptyline AdvReac  Blisters Verified 25 12:00    
     
tramadol AdvReac  hallucinati Verified 25 12:00    
    
   ons      
    
    
    
Family History (Reviewed 25 @ 12:35 by Dr. Sonu Gay MD)    
Other   Cancer    
   Cardiac disorder    
   Hypertension    
   Kidney disease    
   Thyroid disorder    
    
    
    
Surgical History (Reviewed 25 @ 12:35 by Dr. Sonu Gay MD)    
    
H/O elbow surgery    
H/O shoulder surgery    
H/O tubal ligation    
History of dilation and curettage    
History of carpal tunnel surgery    
    
    
Social History (Reviewed 25 @ 12:35 by Dr. Sonu Gay MD)    
household members:  spouse     
Smoking Status:  Current every day smoker tobacco type: e-cigarettes     
alcohol intake:  never     
    
    
    
Review of Systems (Anesthesia)    
ROS Narrative    
System reviewed and no additional complaints, except as documented.

## 2025-02-12 NOTE — PCM.HP.BLA
History and Physical
MR#:

[id number]










Acct:

[id number]



Name:  

JULIA MOYA










Rep #:

[id number]



: 

1970

  




 



Provider:

Dr. Jamey Heart MD



Age/Sex:  

54/F

  




  



Location:

[facility] [place].[facility]



Status:

Signed
















Intake
Vital Signs
 2412:35
Height 5 ft 7 in
Weight: 190 lb 6 oz
BMI 29.8

Intake
Visit Reasons: cervical spine
Chief Complaint: Cervical Spine Pain
Is patient in pain?: Yes (neck ) Pain scale (1-10): 4
Allergies

nortriptyline Adverse Reaction (Verified 25 10:57)
Blisterstramadol Adverse Reaction (Verified 25 10:57)
hallucinations

Medications

?Medication ?Instructions ?Recorded ?Confirmed ?Type
mecobalamin (vitamin B12) 1,000 1,000 mcg sublingual DAILY 24 History
mcg disintegrating    
tablet,sublingual    
topiramate 25 mg tablet (Topamax) 25 mg PO BID 24 History
cholecalciferol (vitamin D3) 25 25 mcg PO DAILY 25 History
mcg (1,000 unit) capsule (Vitamin    
D3)    
cyanocobalamin (vitamin B-12) 100 mcg IM .[id number] 25 History
1,000 mcg/mL injection solution    
(Dodex)    



PFSH
Medical History (Reviewed 25 @ 10:54 by Tierra Roger)

Wears partial dentures
Wears glasses
Hashimoto's disease
Thyroid disease
Arthritis
Back pain
Migraine headache
Electronic cigarette use
Sleep apnea
History of stress test
Menarche
Stroke


Surgical History (Reviewed 25 @ 10:54 by Tierra Roger)

H/O elbow surgery
H/O shoulder surgery
H/O tubal ligation
History of dilation and curettage
History of carpal tunnel surgery


Family History (Reviewed 25 @ 10:54 by Tierra Roger)
Other
Cancer
Cardiac disorder
Hypertension
Kidney disease
Thyroid disorder


Social History (Reviewed 25 @ 10:54 by Tierra Roger)
household members:  spouse 
Smoking Status:  Current every day smoker tobacco type: e-cigarettes 
alcohol intake:  never 



HPI
cervical spine
Chief Complaint: cervical spine
Details: 
This documentation accurately reflects the service provided and the decisions made by me, Dr. Jamey Heart MD [id number]. Part of today?s visit was documented by [ ], acting as scribe.
JULIA MOYA is a 54 year old F here today for pre-op cervical spine. DOS 25 .
24: JULIA MOYA is a 54 year old F here today for cervical spine pain. Patient states the cervical spine has been bothering her for about 2 years but is getting to a point where she can't stand it anymore. Patient states she gets really bad 
headaches and they just keep getting worse. She states the duration of the headaches are what is really bothering her. She states they can last for days. Patient states nothing helps the headaches at all. Patient states she has degenerative disc 
disease and it has gotten worse. She states she has bone spurs in her cervical spine. Patient states during her last surgery it really triggered this pain and headaches. Patient's last surgery was in  and it was her left shoulder. Patient states 
she still has pain in the shoulder. She was supposed to get a shoulder replacement but insurance was an issue. Patient states she has had injections to the cervical spine and thinks the last one was about  and states she does not want anymore. 
Patient states she has numbness/tingling in both arms but the left is worse and goes all the way down into the hand, the right arm just goes to the elbow. Says the tingling is mainly on the top of her hand. She had ulnar nerve surgery in the past 
and had an infection and says that since then she had elbow pain on her right.  Patient states she has trouble gripping things and has weakened hand strength. She states she drops things all of the time such as cups and plates. Patient states she 
has done physical therapy and states she was told this was the first case that they ever gave up on. She states she has a traction machine at home and doesn't get any relief with much at all. Patient had an MRI done in May of the cervical spine. Had 
steroid injections 2 years ago but then found out she has Hashimoto's thyroiditis and Grave's disease. History of stroke when she was 40, no blood thinners or residual weakness. 


Ortho Exam
General
General: Yes no acute distress
Neurologic: Yes alert and Yes oriented x3
Spine
SPINE TESTING 
CERVICAL 
THORACIC 
LUMBAR 
Musculoskeletal 
Strength 0=absent - 5=normal 
Details: 
Neurological exam of the upper extremities shows 5x5 power, shoulder abduction caused left sided shoulder pain. Normal sensations across all dermatomes. No hyperreflexia. Kateryna's negative. Romberg's negative. Single leg stand showed good right 
leg balance, poor left leg. Midline tenderness.  

Coding
Level of Care Code
Off vis,est,level 4

Diagnoses
Cervical radiculopathy  M54.12

Time Spent (min)
35
Assessment and Plan
Assessment and Plan
(1) Cervical radiculopathy: 
      Status: Acute

Plan
Obtained and reviewed xrays today with the patient. Xrays shows loss of disc height at C5-6, no instability on flexion/extension views. Reviewed MRI from May 2024 which showed a C5-6 disc degeneration with left worse than right severe foraminal 
stenosis.
She says that she has had an EMG at [facility] but report is not available.  Denies any diagnosis of carpal tunnel syndrome and that EMG.  Patient denies any volar symptoms into the hand.
Explained imaging findings in detail.  She has severe foraminal stenosis at C5-6 worse on the left side likely the source of her C6 radiculopathy in the left upper extremity.  She has tried injections and physical therapy in the past and feels that 
it was not helpful.  She has had the symptoms at least for 2 to 3 years now.  Her symptoms are getting worse with time and she wishes to proceed with surgical intervention.  Explained risks and benefits of ACDF surgery.  Recommend C5-6 ACDF.  
Discussed risk benefits and alternatives.  The risks include but are not limited to infection, bleeding, hematoma formation, need for further surgery, injury to nerves and vessels, dysphagia, dysphonia, DVT, pulm embolism, pneumonia, atelectasis, 
pseudoarthrosis, hardware failure, adjacent segment degeneration, nerve root injury, spinal cord injury, cardiopulmonary event, thyrotoxic crisis due to her history of hypothyroidism.  Patient understands and agrees to proceed with surgery.  Consent 
was signed.

## 2025-02-12 NOTE — POSTOPAN2_ITS
Anesthesia Postop Eval I Sum    
Postop Eval Completion status    
Anesthesia document: Postop Eval 1 completed: Yes    
Anesthesia Postop Eval I Summary    
Anesthesia Postop Eval I Summary:     
Anesthesia Postop Eval I:  Assessment Summary    
    
    
    
Airway patent                  Yes                02/12/25 15:43 [email or web address]    
     
Spontaneous unlabored          Yes                02/12/25 15:43 [email or web address]    
    
respirations                         
     
Mental status                        
     
nausea                         No                 02/12/25 15:43 [email or web address]    
     
Vomiting                       No                 02/12/25 15:43 [email or web address]    
    
    
    
Anesthesia Postop Eval I: Fluid Summary    
    
    
    
Crystalloid volume administer  800                02/12/25 15:43 [email or web address]    
    
(ml)                                 
     
Colloids volume administered (       
    
ml)                                  
     
Blood Product volume                 
    
administered (ml)                    
     
Total IV fluid infused         800                02/12/25 15:43 [email or web address]    
    
    
    
Anesthesia Postop Eval I: Summary Notes    
    
    
    
Anesthesia Complication        No                 02/12/25 15:43 [email or web address]    
     
Anesthesia Complication              
    
Comment:                             
     
Post-operative progress note         
    
    
    
    
    
Anesthesia: Postop Eval II    
Evaluation    
Mental status: Awake    
Pain Level: 0    
nausea: No    
Vomiting: No

## 2025-02-12 NOTE — POSTOP.ANE_ITS
Anesthesia: Postop Eval I    
Current Vital Signs    
Temperature: 97.3 F    
Pulse Rate: 83    
Blood Pressure: 124/69    
Respiratory Rate: 2    
Pulse Ox: 96    
Assessment    
Airway patent: Yes    
Spontaneous unlabored respirations: Yes    
nausea: No    
Vomiting: No    
Anesthesia Complication: No    
Fluid Hydration    
Crystalloid volume administer (ml): 800    
Total IV fluid infused: 800    
Progress Note    
Anesthesia document: Postop Eval 1 completed: Yes

## 2025-02-12 NOTE — HP.PCM_ITS
History and Physical    
MR#:    
    
[id number]    
    
    
    
    
    
    
    
    
    
    
Acct:    
    
[id number]    
    
    
    
Name:      
    
JULIA MOYA    
    
    
    
    
    
    
    
    
    
    
Rep #:    
    
[id number]    
    
    
    
:     
    
1970    
    
      
    
    
    
    
     
    
    
    
Provider:    
    
Dr. Jamey Heart MD    
    
    
    
Age/Sex:      
    
54/F    
    
      
    
    
    
    
      
    
    
    
Location:    
    
[facility] [email or web address]    
    
    
    
Status:    
    
Signed    
    
    
    
    
    
    
    
    
    
    
    
    
    
    
    
    
Intake    
Vital Signs    
    
    
                                  2412:35    
     
Height                              5 ft 7 in    
     
Weight:                            190 lb 6 oz    
     
BMI                                   29.8    
    
    
                                     Intake    
                          Visit Reasons: cervical spine    
                      Chief Complaint: Cervical Spine Pain    
              Is patient in pain?: Yes (neck ) Pain scale (1-10): 4    
                                    Allergies    
                                            
            nortriptyline Adverse Reaction (Verified 25 10:57)    
Blisterstramadol Adverse Reaction (Verified 25 10:57)    
hallucinations    
    
Medications    
    
    
    
?Medication ?Instructions ?Recorded ?Confirmed ?Type    
     
mecobalamin (vitamin B12) 1,000         1,000 mcg sublingual DAILY 24     
History    
     
mcg disintegrating        
     
tablet,sublingual        
     
topiramate 25 mg tablet (Topamax)       25 mg PO BID 24 History    
     
cholecalciferol (vitamin D3) 25         25 mcg PO DAILY 25 Histor    
y    
     
mcg (1,000 unit) capsule (Vitamin        
     
D3)        
     
cyanocobalamin (vitamin B-12)           100 mcg IM .[id number] 25 Hi    
story    
     
                                        1,000 mcg/mL injection solution        
     
                                        (Dodex)        
    
    
    
    
PFS    
Medical History (Reviewed 25 @ 10:54 by Tierra Roger)    
    
Wears partial dentures    
Wears glasses    
Hashimoto's disease    
Thyroid disease    
Arthritis    
Back pain    
Migraine headache    
Electronic cigarette use    
Sleep apnea    
History of stress test    
Menarche    
Stroke    
    
    
Surgical History (Reviewed 25 @ 10:54 by Tierra Roger)    
    
H/O elbow surgery    
H/O shoulder surgery    
H/O tubal ligation    
History of dilation and curettage    
History of carpal tunnel surgery    
    
    
Family History (Reviewed 25 @ 10:54 by Tierra Roger)    
Other    
Cancer    
Cardiac disorder    
Hypertension    
Kidney disease    
Thyroid disorder    
    
    
Social History (Reviewed 25 @ 10:54 by Tierra Roger)    
household members:  spouse     
Smoking Status:  Current every day smoker tobacco type: e-cigarettes     
alcohol intake:  never     
    
    
    
HPI    
cervical spine    
Chief Complaint: cervical spine    
Details:     
This documentation accurately reflects the service provided and the decisions   
made by me, Dr. Jamey Heart MD 25 [id number]. Part of today?s visit was   
documented by [ ], acting as scribe.    
JULIA MOYA is a 54 year old F here today for pre-op cervical spine. DOS 25  
.    
24: JULIA MOYA is a 54 year old F here today for cervical spine pain.   
Patient states the cervical spine has been bothering her for about 2 years but   
is getting to a point where she can't stand it anymore. Patient states she gets   
really bad headaches and they just keep getting worse. She states the duration   
of the headaches are what is really bothering her. She states they can last for   
days. Patient states nothing helps the headaches at all. Patient states she has   
degenerative disc disease and it has gotten worse. She states she has bone spurs  
in her cervical spine. Patient states during her last surgery it really   
triggered this pain and headaches. Patient's last surgery was in  and it was  
her left shoulder. Patient states she still has pain in the shoulder. She was   
supposed to get a shoulder replacement but insurance was an issue. Patient   
states she has had injections to the cervical spine and thinks the last one was   
about  and states she does not want anymore. Patient states she has   
numbness/tingling in both arms but the left is worse and goes all the way down   
into the hand, the right arm just goes to the elbow. Says the tingling is mainly  
on the top of her hand. She had ulnar nerve surgery in the past and had an   
infection and says that since then she had elbow pain on her right.  Patient   
states she has trouble gripping things and has weakened hand strength. She   
states she drops things all of the time such as cups and plates. Patient states   
she has done physical therapy and states she was told this was the first case   
that they ever gave up on. She states she has a traction machine at home and   
doesn't get any relief with much at all. Patient had an MRI done in May of the   
cervical spine. Had steroid injections 2 years ago but then found out she has   
Hashimoto's thyroiditis and Grave's disease. History of stroke when she was 40,   
no blood thinners or residual weakness.     
    
    
Ortho Exam    
General    
General: Yes no acute distress    
Neurologic: Yes alert and Yes oriented x3    
Spine    
SPINE TESTING     
CERVICAL     
THORACIC     
LUMBAR     
Musculoskeletal     
Strength 0=absent - 5=normal     
Details:     
Neurological exam of the upper extremities shows 5x5 power, shoulder abduction   
caused left sided shoulder pain. Normal sensations across all dermatomes. No   
hyperreflexia. Kateryna's negative. Romberg's negative. Single leg stand showed   
good right leg balance, poor left leg. Midline tenderness.      
    
Coding    
Level of Care Code    
Off vis,est,level 4    
    
Diagnoses    
Cervical radiculopathy  M54.12    
    
Time Spent (min)    
35    
Assessment and Plan    
Assessment and Plan    
(1) Cervical radiculopathy:     
      Status: Acute    
    
Plan    
Obtained and reviewed xrays today with the patient. Xrays shows loss of disc   
height at C5-6, no instability on flexion/extension views. Reviewed MRI from May  
2024 which showed a C5-6 disc degeneration with left worse than right severe   
foraminal stenosis.    
She says that she has had an EMG at [facility] but report is not available.   
Denies any diagnosis of carpal tunnel syndrome and that EMG.  Patient denies any  
volar symptoms into the hand.    
Explained imaging findings in detail.  She has severe foraminal stenosis at C5-6  
worse on the left side likely the source of her C6 radiculopathy in the left   
upper extremity.  She has tried injections and physical therapy in the past and   
feels that it was not helpful.  She has had the symptoms at least for 2 to 3   
years now.  Her symptoms are getting worse with time and she wishes to proceed   
with surgical intervention.  Explained risks and benefits of ACDF surgery.    
Recommend C5-6 ACDF.  Discussed risk benefits and alternatives.  The risks   
include but are not limited to infection, bleeding, hematoma formation, need for  
further surgery, injury to nerves and vessels, dysphagia, dysphonia, DVT, pulm   
embolism, pneumonia, atelectasis, pseudoarthrosis, hardware failure, adjacent   
segment degeneration, nerve root injury, spinal cord injury, cardiopulmonary   
event, thyrotoxic crisis due to her history of hypothyroidism.  Patient   
understands and agrees to proceed with surgery.  Consent was signed.

## 2025-02-12 NOTE — RAD_ITS
Fluoroscopic guidance was used intraoperatively.  Please refer to the operative 
report for further details.  Total fluoroscopy  
time 7.1 seconds.  Total radiation dose 0.62 mGy total 5 spot images were 
obtained.  
   
Electronically Signed By: Karson Brooks on 02/14/2025 09:09  
Reading Location: [facility]

## 2025-02-12 NOTE — OP.PCM_ITS
Procedures Musculoskeletal    
20xxx-29xxx: Other Procedure See Report    
    
Operative Report (Standard)    
Operative Information    
Date of Procedure: 02/12/25    
Pre-Operative Diagnosis: C5-6 disc degeneration with severe foraminal stenosis,   
radiculopathy    
Post-Operative Diagnosis: Same    
Surgery/Procedure Performed: C5-6 ACDF    
FIRST Assistant: Yes First Assistant: Arabella Patricio      
Tasks completed by first assist: Closing, Removing tissue, Hemostasis:   
Electrocautery and Retracting    
Type of Anesthesia: General    
RN Documented Start/Stop Times:     
    
                                            
    
                         Operation Date: 02/12/25 13:30    
    
    
Case Time      
    
Into Pre-Op 02/12/25 11:11    
    
Anesthesia Start 02/12/25 13:43    
    
Into Room 02/12/25 13:43    
    
Procedure Start 02/12/25 14:13    
    
    
    
    
Procedure Start Time: 14:13    
Procedure Stop Time: 15:35    
Select all DRAINS/GRAFTS/IMPLANTS that apply: Graft Graft details: Allograft   
cortical cancellous strut, DBX  and     
Implanted device Implanted device details: Medtronic Atlantis Elite plate   
instrumentation    
Estimated Blood Loss: 20 cc    
Specimen collected: No    
Description of surgery:     
Preoperative diagnosis: C5-6 disc degeneration with stenosis, radiculopathy    
    
Postoperative diagnosis: Same    
    
Name of procedure: C5-6 anterior cervical discectomy and fusion with plate   
instrumentation    
- Anterior cervical fusion C5-6, CPT code 77776    
- Anterior plate instrumentation C5-6, CPT code 31128/59    
-C5-6 structural allograft bone with DBX, CPT code 67917    
    
Attending surgeon:  Jamey Heart M.D.    
    
Anesthesia: Gen. endotracheal    
    
Estimated blood loss: 20 mL    
    
Complications: None    
    
Instrumentation used: Medtronic Atlantis Elite plate, LASR corticocancellous     
block    
    
Indications: The patient is a pleasant 54-year-old lady who presented with neck   
pain, left worse than right upper extremity radiation of pain and numbness.  MRI  
showed C5-6 disc degeneration with severe left worse than right foraminal   
stenosis.  After prolonged course of nonsurgical treatment, the patient   
requested surgical treatment. All risks and benefits of the procedure were   
explained to the patient. The risks include but are not limited to infection,   
bleeding, injury to nerves and vessels, vertebral artery injury, spinal cord   
injury, paralysis, vocal cord paralysis, injury to esophagus, pseudoarthrosis,   
need for further procedures, adjacent segment degeneration.     
    
Procedure: The patient was identified in the preoperative suite using unique     
patient identifiers. Skin was marked consent was taken and all questions were     
answered. The patient was then brought back to the operative room and a timeout   
was performed. General endotracheal anesthesia was given. Intraoperative neuro   
monitoring leads were applied. The patient was carefully positioned supine on a   
regular OR table. A lateral view with a C-arm was done to identify the level and  
to define the incision. The anterior neck was then prepped and draped in the   
usual fashion. A final timeout was then performed.  A transverse skin incision   
was taken to the left of midline. Subcutaneous tissue was then divided with   
Bovie. Platysma was identified and cut along the incision with scissors. The   
fascial interval between the sternocleidomastoid and the larynx was developed.   
Omohyoid was identified and retracted.  The esophagus with the larynx was   
retracted medially to reach the prevertebral fascia.  Marker x-ray was performed  
with bent spinal needle and disc space and levels were confirmed.  Longus coli   
muscle was elevated on both sides at and above and below C5-6 disc.     
Self-retaining retractors were then placed.  A long handle knife was then used   
to perform annulotomy at C5-6. Disc fragments were removed with the pituitary.    
[place] pins were placed in C5 and C6 for disc distraction.  Curettes and bur was  
utilized to remove cartilage from the endplates. Discectomy was performed   
laterally up to the uncovertebral joints.  Posterior osteophytes were thinned   
down with the bur and adequate decompression in the central and foraminal areas   
were performed and PLL was thinned out.    
Once the disc space was prepared, trials of various sizes were utilized.   
Thorough irrigation was given.  6 mm LASR cortical cancellous allograft bone   
large footprint was then fashioned in such a way that concavities were burred   
out inferiorly and superiorly and half cc of DBX (demineralized bone matrix) was  
squeezed into the cancellous portion. The graft was then inserted into the C5-6   
disc space.   The graft was found to be in good apposition with good pullout   
strength. A 23 mm  Medtronic Atlantis Elite plate was then fixed to C5-6 with 15  
mm screws. A lateral x-ray was then taken to check the length of the screws.    
Both AP and lateral x-rays showed good positioning of plate and screws.  The   
locking mechanism over the screw heads was then turned. Thorough irrigation was   
again     
given. Hemostasis was achieved with bipolar and Floseal.  Closure was done with   
3-0 Vicryl for the platysma and subcutaneous tissue layers and 4-0 Monocryl for   
the skin. Steri-Strips were applied and dressing was done with 4 x 4 gauze and   
Tegaderm. A cervical collar was then applied. The patient was then woken up from  
anesthesia extubated and taken to PACU in stable condition.     
    
Intraoperative neuro monitoring was performed throughout this procedure. Motor   
evoked potentials were run periodically.  All potentials remained at baseline   
throughout the procedure.  I was present for the entire surgery and performed   
the surgery myself.    
    
Assistant Arabella Patricio PA-C. My physician assistant was a vital part of this   
case.  They were important in appropriate retraction during the case, and   
protection of soft tissues during the procedure. Their intimate knowledge of the  
case and my steps aided in safe and expedient completion of the procedure as   
well as appropriate position of the patient during the surgery.  They were also   
vital in assisting with closure under my direct supervision.    
Surgical Findings:     
See operative note    
Complications    
Complications: No

## 2025-02-12 NOTE — PCM.PRE.AN2
ASA Classification*
ASA Classification
ASA Classification: 2

Assessment & Plan Anesthesia*
Anesthesia Assessment
Anesthesia Assessment: 

Discussed sedation and/or anesthesia options, risks, benefits, and alternatives with patient/parents/legal guardian/POA. Questions invited. 

The patient/parents/legal guardian/POA seems to understand and agrees to proceed with anesthesia plan.

Reviewed the physical assessment, medical history, allergy history and patient home medications list prior to surgery/procedure/anesthetic and documented any changes.

Performed airway and anesthesia risk assessments.

Anesthesia Type
Anesthesia Type: General

Anesthesia Focused Assessment*
Temperature: 97.1 F
Pulse Rate: 81
Blood Pressure: 121/77
Respiratory Rate: 16
Pulse Ox: 100
Airway Assessment
Mouth opens: >3 cm
Mallampati Score: II

Focused Labs
Anesthesia Preop lab: 
       *** CBC ***  
      WBC 3.8 K/mm3 (4.4-11.0)  L 25 09:25
      RBC 4.49 M/mm3 (4.2-5.4)  25 09:25
      Hgb 13.7 g/dL (12.0-15.0)  25 09:25
      Hct 41.8 % (37-47)  25 09:25
      Plt Count 304 K/mm3 (150-450)  25 09:25
       *** CHEMISTRY ***  
      Potassium 3.8 mmol/L (3.5-5.1)  25 09:25
      Sodium 141 mmol/L (136-145)  25 09:25
      Magnesium 2.3 mg/dL (1.6-2.6)  25 09:25
      BUN 14 mg/dL (7-18)  25 09:25
      Creatinine 0.88 mg/dL (0.55-1.02)  25 09:25
      Glucose 92 mg/dL ()  25 09:25
      TSH 1.300 uIU/mL (0.358-3.740)  25 09:25
       *** COAG ***  
       *** PREGNANCY ***  


Pre-Assessment
Diagnosis/Proposed Procedure
Planned Operative Procedure(s): Anterior Cervical Fusion C5-6
Anesthesia History
Anesthesia History - RN Documentation: 
Anesthesia History - RN Documentation

Hx Hospitalization             No                           25 09:03
Any Problems With Anesthesia   No                           25 09:03
Cholinesterase deficiency      No                           25 09:03
You/Your Family Experience     No                           25 09:03
fever (hyperthermia) with       
Relationship                    
Recent Exposure to Contagious  No                           25 12:02
Disease                         
Does patient have nerve        No                           25 09:03
stimulator                      
Patient instructed to have      
device shut off                 
--Does patient have Pacemaker  No                           25 12:02
or ICD?                         
When Was Last Pacemaker Check   


*** QUESTION #4 FULL TEXT: You/Your Family Experience fever (hyperthermia) with Anesthesia

Last Oral Intake
Last Oral intake: 
Last Oral Intake

NPO since                      08:00                        25 12:02
Meds taken in AM with sips of  No                           25 12:02
water?                          
Meds patient instructed to      
take am of surgery              



PONV
PONV - RN Documentation: 
PONV - RN Documentation

Female                         Yes                          25 09:03
HX of Motion Sickness          No                           25 09:03
HX of N/V After Surgery        No                           25 09:03
Non-Smoker                     No                           25 09:03
Duration of Surgery greater    Yes                          25 09:03
than 60 minutes                 
Number of Risk Factors         2                            25 09:03
PONV Score                     Moderate Risk                25 09:03



Height & Weight
Height & Weight: 
Anesthesia: Height & Weight

Height                         5 ft 7 in                    25 12:02
Weight:                        85 kg                        25 12:02
Body Mass Index (BMI)          29.3                         25 12:02



Respiratory Assessment
Respiratory Assessment - RN Documentation: 
Respiratory Tract Infection Hx - RN Documentation

Hx Respiratory Tract Infection No                           25 09:03



STOP Sleep Apnea
STOP Sleep Apnea - RN Documentation: 
STOP Sleep Apnea - RN Documentation

Hx Hypertension                No                           25 09:03
Hx Sleep Apnea                 Yes: NON-COMPLIANT           25 09:03
CPAP                           No                           25 09:03
BIPAP                          No                           25 09:03
Do you snore loudly (louder     
than talking or can be heard    
Do you often feel tired/        
fatigued/ sleepy during         
daytime?                        
Has anyone observed you stop    
breathing during sleep?         
STOP Results                   Positive                     25 09:03



*** QUESTION #5 FULL TEXT : Do you snore loudly (louder than talking or can be heard through closed doors)? 

Tobacco Use History
Tobacco Use History - RN Documentation: 
Tobacco Use History - RN Documentation

Tobacco Use                     
Smoking Status                 Current every day smoker     25 09:03
Hx Tobacco Use                 No                           25 09:03
Years Smoking                   
Packs Smoked per Day            
Smoking Cessation Date was      
within the last 15 years        
Hx Smoking Cessation Date       
Hx Smoking Cessation            
Counseling                      



Hematologic Medial History
Hematologic Hx - RN Documentation: 
Hematologic Medical Hx - RN documentation

Hx of Blood Transfusion        No                           25 09:03
Hx of Transfusion in last 3    No                           25 09:03
Months                          
Date of Last Transfusion (if    
within last 3 months)           
Ever experience any problems   No                           25 09:03
with transfusion(s)?            
Specify any problems            
Hx of Preganancy in last 3     No                           25 09:03
Months                          
Nurse Filling Out Transfusion  VCHRISTIN                    25 09:03
& Pregnancy Questions:          
Date:                          25 09:03
Time:                          09:05                        25 09:03
Patient unable to answer at     
this time (ie. confused,        
unrespo                         



Pregnancy/Reproduction History
Pregnancy/Reproductive History - RN Documentation: 
Pregnancy/Reproductive Hx- RN Documentation

Hx Pregnant Now                No                           25 09:03
Gestational Age (in weeks):     
EDC:                            
Hx                       
Hx Para                         
Hx  Section             
SAB                             
Breastfeeding                  No                           25 09:03



Active Medications
Active Medications: 
Current Medications

Generic Name Dose Route Start Last Admin
  Trade Name Freq  PRN Reason Stop Dose Admin
Acetaminophen  1,000 mg  25 13:30  25 12:12
  Acetaminophen 500 Mg Tablet  PO  25 13:31  1,000 mg
  X1 ONE   Administration
Dexamethasone Sodium Phosphate  8 mg  25 13:30 
  Dexamethasone 10 Mg/Ml Vial  IV  25 13:31 
  X1 ONE  
Dexamethasone Sodium Phosphate  4 mg  25 13:30 
  Dexamethasone 4 Mg/Ml Vial  IV  25 13:31 
  X1 ONE  
Cefazolin Sodium 2 gm/ Sodium  110 mls @ 150 mls/hr  25 13:30 
  Chloride  IV  25 14:13 
  PREOP ONE  
Tranexamic Acid 1,000 mg/  110 mls @ 440 mls/hr  25 13:30 
  Sodium Chloride  IV  25 13:44 
  X1 ONE  
Tranexamic Acid 1,000 mg/  110 mls @ 440 mls/hr  25 13:30 
  Sodium Chloride  IV  25 13:44 
  X1 ONE  
Magnesium Sulfate 1 gm/  102 mls @ 408 mls/hr  25 13:30  25 12:00
  Dextrose  IV  25 13:44  408 mls/hr
  X1 ONE   Administration
Sodium Chloride  1,000 mls @ 15 mls/hr  25 11:15  25 12:11
  IV  25 00:34  15 mls/hr
  .Q48H LEXIE   Administration
  Protocol  
Insulin Human Lispro  1 - 6 unit  25 13:30 
  Insulin Lispro 100 Unit/Ml Insuln.Pen  SC  25 23:59 
  Q4H PRN PRN  
  BG>/= 180, SEE PROTOCOL  
  Protocol  



PFSH
Medical History (Reviewed 25 @ 12:35 by Dr. Sonu Gay MD)

Wears partial dentures
Wears glasses
Hashimoto's disease
Thyroid disease
Arthritis
Back pain
Migraine headache
Electronic cigarette use
Sleep apnea
History of stress test
Menarche
Stroke


Home Medications

?Medication ?Instructions ?Recorded ?Last Taken ?Type
mecobalamin (vitamin B12) 1,000 1,000 mcg sublingual DAILY 24 Unknown History
mcg disintegrating    
tablet,sublingual    
topiramate 25 mg tablet (Topamax) 25 mg PO BID 24 Unknown History
cholecalciferol (vitamin D3) 25 25 mcg PO DAILY 25 Unknown History
mcg (1,000 unit) capsule (Vitamin    
D3)    
cyanocobalamin (vitamin B-12) 100 mcg IM .[id number] 25 Unknown History
1,000 mcg/mL injection solution    
(Dodex)    
hydrocodone-acetaminophen 5-325mg 1 tab PO DAILY PRN pain 25 Unknown History
5mg-325mg    




Allergy/AdvReac Type Severity Reaction Status Date / Time
oxycodone Allergy  hives Verified 25 12:00
nortriptyline AdvReac  Blisters Verified 25 12:00
tramadol AdvReac  hallucinati Verified 25 12:00
   ons  


Family History (Reviewed 25 @ 12:35 by Dr. Sonu Gay MD)
Other
 Cancer
 Cardiac disorder
 Hypertension
 Kidney disease
 Thyroid disorder



Surgical History (Reviewed 25 @ 12:35 by Dr. Sonu Gay MD)

H/O elbow surgery
H/O shoulder surgery
H/O tubal ligation
History of dilation and curettage
History of carpal tunnel surgery


Social History (Reviewed 25 @ 12:35 by Dr. Sonu Gay MD)
household members:  spouse 
Smoking Status:  Current every day smoker tobacco type: e-cigarettes 
alcohol intake:  never 



Review of Systems (Anesthesia)
ROS Narrative
System reviewed and no additional complaints, except as documented.